# Patient Record
Sex: FEMALE | Race: WHITE | NOT HISPANIC OR LATINO | Employment: FULL TIME | URBAN - METROPOLITAN AREA
[De-identification: names, ages, dates, MRNs, and addresses within clinical notes are randomized per-mention and may not be internally consistent; named-entity substitution may affect disease eponyms.]

---

## 2017-01-09 ENCOUNTER — TELEPHONE (OUTPATIENT)
Dept: MIDWIFE SERVICES | Facility: CLINIC | Age: 32
End: 2017-01-09

## 2017-01-09 NOTE — TELEPHONE ENCOUNTER
PA was denied from insurance company for NuvaRing. It is non-formulary and patient needs to try alternative therapies and fail before this can be approved. Left message to call clinic to inform patient and see what other birth control options she would like to try. Thanks!  Rhina Alexander

## 2017-01-22 ENCOUNTER — TELEPHONE (OUTPATIENT)
Dept: NURSING | Facility: CLINIC | Age: 32
End: 2017-01-22

## 2017-01-22 ENCOUNTER — TELEPHONE (OUTPATIENT)
Dept: MIDWIFE SERVICES | Facility: CLINIC | Age: 32
End: 2017-01-22

## 2017-01-22 DIAGNOSIS — Z30.011 ENCOUNTER FOR INITIAL PRESCRIPTION OF CONTRACEPTIVE PILLS: Primary | ICD-10-CM

## 2017-01-23 NOTE — TELEPHONE ENCOUNTER
Call Type: Triage Call    Presenting Problem: Appeal for prior authorization for patient's  Nuvaring has been denied.  Triage Note:  Guideline Title: No Guideline - Advice Per Reference (Adult)  Recommended Disposition: Provide Home/Self Care  Original Inclination: Wanted to speak with a nurse  Override Disposition:  Intended Action: Follow advice given  Physician Contacted: No  PROVIDE HOME/SELF CARE ?  YES  SEE ED IMMEDIATELY ? NO  CALL POISON CENTER IMMEDIATELY ? NO  CALL LOCAL AGENCY IMMEDIATELY ? NO  SEE DENTIST WITHIN 4 HOURS ? NO  SEE DENTIST WITHIN 24 HOURS ? NO  CALL LOCAL AGENCY WITHIN 24 HOURS ? NO  SEE DENTIST WITHIN 72 HOURS ? NO  ACTIVATE  ? NO  SEE PROVIDER WITHIN 4 HOURS ? NO  SEE PROVIDER WITHIN 24 HOURS ? NO  CALL PROVIDER WITHIN 24 HOURS ? NO  SEE PROVIDER WITHIN 72 HOURS ? NO  SEE PROVIDER WITHIN 2 WEEKS ? NO  CALL PROVIDER WITHIN 72 HOURS ? NO  SEE DENTIST WITHIN 2 WEEKS ? NO  CALL PROVIDER IMMEDIATELY ? NO  Physician Instructions:  Care Advice:

## 2017-01-23 NOTE — TELEPHONE ENCOUNTER
Pt filed an appeal with her insurance company. Pt will wait until Monday of next week to call back and let us know if the appeal has been approved or if she needs to request alternate form of birth control. Maria R Duke RN

## 2017-01-23 NOTE — TELEPHONE ENCOUNTER
Clinic Action Needed:No  Reason for Call: Appeal for Prior Authorization for Nuvaring has been denied.  Routed to: Midwife Providers  Marina Berg RN  [facility]

## 2017-01-23 NOTE — TELEPHONE ENCOUNTER
PA denied. Fax also received. Call to patient to make her aware and see if she wants to try OCP or come back in to discuss other birth control options.  Rhina Alexander

## 2017-01-31 RX ORDER — LEVONORGESTREL/ETHIN.ESTRADIOL 0.1-0.02MG
1 TABLET ORAL DAILY
Qty: 84 TABLET | Refills: 3 | Status: SHIPPED | OUTPATIENT
Start: 2017-01-31 | End: 2019-12-10

## 2017-01-31 NOTE — TELEPHONE ENCOUNTER
Patient stated that appeal is still in process - patient is wondering if another OCP can be sent in the meantime. Patient is hoping for an OCP that is as similar to the Nuvering as possible. Pharmacy teed up.   Rhina Alexander

## 2017-02-01 NOTE — TELEPHONE ENCOUNTER
Ordered an OCP for the patient. Please call her and let her know. She can start this when her Nuva Ring is up. Use back up for 2 weeks until her body adjust to the new birth control. If she misses a pill take two the next day and use back up for 2 weeks. If she misses more than 1 skip those pills and use back up for 2 weeks. Please review ACHES as well. Let me know if she needs anything else. Thank you! Celestina Seay CNM

## 2017-11-11 ENCOUNTER — HEALTH MAINTENANCE LETTER (OUTPATIENT)
Age: 32
End: 2017-11-11

## 2017-12-18 DIAGNOSIS — Z30.44 ENCOUNTER FOR SURVEILLANCE OF VAGINAL RING HORMONAL CONTRACEPTIVE DEVICE: ICD-10-CM

## 2017-12-18 RX ORDER — ETONOGESTREL AND ETHINYL ESTRADIOL VAGINAL RING .015; .12 MG/D; MG/D
RING VAGINAL
Qty: 1 EACH | Refills: 0 | Status: SHIPPED | OUTPATIENT
Start: 2017-12-18 | End: 2017-12-28

## 2017-12-18 NOTE — TELEPHONE ENCOUNTER
Pending Prescriptions:                       Disp   Refills    etonogestrel-ethinyl estradiol (NUVARING)*3 each 0            Sig: Insert 1 ring vaginally every 21 days then remove           for 1 week then repeat with new ring.    Last OV was 11/30/16. Due for AE. TC to patient. Pt aware she needs to schedule AE. Will call back and schedule as she has to request time off from work. One month refill sent to pharmacy to bridge until she schedules AE.   Janee Casillas, RN-BSN

## 2017-12-28 ENCOUNTER — OFFICE VISIT (OUTPATIENT)
Dept: MIDWIFE SERVICES | Facility: CLINIC | Age: 32
End: 2017-12-28
Payer: COMMERCIAL

## 2017-12-28 VITALS
BODY MASS INDEX: 34.49 KG/M2 | DIASTOLIC BLOOD PRESSURE: 89 MMHG | HEART RATE: 118 BPM | OXYGEN SATURATION: 98 % | WEIGHT: 202 LBS | SYSTOLIC BLOOD PRESSURE: 148 MMHG | HEIGHT: 64 IN

## 2017-12-28 DIAGNOSIS — Z30.44 ENCOUNTER FOR SURVEILLANCE OF VAGINAL RING HORMONAL CONTRACEPTIVE DEVICE: ICD-10-CM

## 2017-12-28 DIAGNOSIS — J44.89 CHRONIC OBSTRUCTIVE AIRWAY DISEASE WITH ASTHMA (H): ICD-10-CM

## 2017-12-28 DIAGNOSIS — Z01.419 ENCOUNTER FOR GYNECOLOGICAL EXAMINATION WITHOUT ABNORMAL FINDING: Primary | ICD-10-CM

## 2017-12-28 PROCEDURE — 99395 PREV VISIT EST AGE 18-39: CPT | Performed by: ADVANCED PRACTICE MIDWIFE

## 2017-12-28 PROCEDURE — G0476 HPV COMBO ASSAY CA SCREEN: HCPCS | Performed by: ADVANCED PRACTICE MIDWIFE

## 2017-12-28 PROCEDURE — G0145 SCR C/V CYTO,THINLAYER,RESCR: HCPCS | Performed by: ADVANCED PRACTICE MIDWIFE

## 2017-12-28 RX ORDER — ETONOGESTREL AND ETHINYL ESTRADIOL VAGINAL RING .015; .12 MG/D; MG/D
RING VAGINAL
Qty: 3 EACH | Refills: 3 | Status: SHIPPED | OUTPATIENT
Start: 2017-12-28 | End: 2019-12-10

## 2017-12-28 RX ORDER — ALBUTEROL SULFATE 90 UG/1
AEROSOL, METERED RESPIRATORY (INHALATION)
Qty: 1 INHALER | Refills: 3 | Status: SHIPPED | OUTPATIENT
Start: 2017-12-28 | End: 2020-08-03

## 2017-12-28 NOTE — PROGRESS NOTES
Gardenia  (Dee) is a 32 year old  female who presents for annual exam.     Menses are regular q 28-30 days and variable lasting varies days.  Menses flow: normal and varies.  Patient's last menstrual period was 2017.. Using oral contraceptives for contraception.  She is not currently considering pregnancy.  Besides routine health maintenance, she has no other health concerns today .  GYNECOLOGIC HISTORY:  Menarche:       Gardenia is sexually active with 1male partner(s) and is currently in monogamous relationship with .    History sexually transmitted infections:No STD history  STI testing offered?  Declined  ELLIOTT exposure: No  History of abnormal Pap smear: NO - age 30- 65 PAP every 3 years recommended  Family history of breast CA: No  Family history of uterine/ovarian CA: No    Family history of colon CA: No    HEALTH MAINTENANCE:  Cholesterol: (Cholesterol   Date Value Ref Range Status   10/25/2016 234 (H) <200 mg/dL Final     Comment:     Desirable:       <200 mg/dl    History of abnormal lipids: No  Mammo: 0 . History of abnormal Mammo: Not applicable.  Regular Self Breast Exams: Yes  Calcium/Vitamin D intake: source:  dairy, dietary supplement(s) Adequate? Yes  TSH: (  TSH   Date Value Ref Range Status   10/25/2016 0.84 0.40 - 4.00 mU/L Final    )  Pap; (  Lab Results   Component Value Date    PAP NIL 2014    PAP NIL 2011    PAP NIL 2007    )    HISTORY:  Obstetric History       T0      L0     SAB0   TAB0   Ectopic0   Multiple0   Live Births0         Past Medical History:   Diagnosis Date     ASCUS favor benign 3/2006    neg HPV     Morbid obesity due to excess calories (H) 10/31/2016     Nexplanon insertion 2014    Implanon removed and replaced with Nexplanon nexplanon lot# [id number]  exp:  10/16      Streptococcal sore throat     recurrent pharyngitis     UTI 2003     Past Surgical History:   Procedure Laterality Date     implanon  12      Family History   Problem Relation Age of Onset     Asthma Mother      Heart Failure Mother      Hypertension Mother      Chronic Obstructive Pulmonary Disease Mother      DIABETES Mother      Hypertension Father      Lipids Father      GASTROINTESTINAL DISEASE Father      CANCER Maternal Grandmother      d:  age 53; lung with mets; smoker     Neurologic Disorder Maternal Aunt      b:  1967 ; [place]     Seizure Disorder Brother      Social History     Social History     Marital status: Single     Spouse name: N/A     Number of children: N/A     Years of education: N/A     Social History Main Topics     Smoking status: Never Smoker     Smokeless tobacco: Never Used     Alcohol use Yes      Comment: occ     Drug use: No     Sexual activity: Yes     Partners: Male     Birth control/ protection: Implant      Comment: nuva ring     Other Topics Concern     None     Social History Narrative    Born in [facility], lives with boyfriend of 16 yrs as of 2016, 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, doesn't drive        Balanced Diet - No    Osteoporosis Prevention Measures - lactose intollerant takes pills    Regular Exercise -  Stands all day at work    Dental Exam - Yes, almost a year ago    Seatbelts used - Yes,when remembers    Self Breast Exam - sometimes    Abuse: Current or Past (Physical, Sexual or Emotional)- No    Do you have any concerns about STD's -  No    Do you feel safe in your environment - Yes    Guns stored in the home - No    Sunscreen used - Never out in sun    Lipids -  NO    Glucose -  NO    Eye Exam - When she was 16yrs old    Colon Cancer Screening - No    Hemoccults - NO    Pap Test -  Yers-03/21/06    Mammography - NO    DEXA - NO    Immunizations reviewed and up to date - yes, last td given 03/21/06       Current Outpatient Prescriptions:      etonogestrel-ethinyl estradiol (NUVARING) 0.12-0.015 MG/24HR vaginal ring, Insert 1 ring vaginally every 21 days then remove for 1 week then repeat with new  "ring., Disp: 1 each, Rfl: 0     IBUPROFEN 200 MG PO TABS, 1 TABLET EVERY 4 TO 6 HOURS AS NEEDED, Disp: , Rfl:      levonorgestrel-ethinyl estradiol (AVIANE,ALESSE,LESSINA) 0.1-20 MG-MCG per tablet, Take 1 tablet by mouth daily (Patient not taking: Reported on 12/28/2017), Disp: 84 tablet, Rfl: 3     VENTOLIN  (90 BASE) MCG/ACT Inhaler, INHALE 2 PUFFS PO Q 4 H PRN, Disp: , Rfl: 1     etonogestrel (IMPLANON) 68 MG IMPL, 1 each (68 mg) by Subdermal route once for 1 dose, Disp: 1 each, Rfl: 0     ORDER FOR DME, daily., Disp: 1 Device, Rfl: 0     Allergies   Allergen Reactions     Adhesive Tape      Bandaids irritate skin     Amoxicillin      vaginal irritation after 3-4 days     Azithromycin      zithromax--migraines and stomach cramps     Corn-Related Products      Sulfamethoxazole W/Trimethoprim      GI     Food Nausea     Food intolerance test taken corn ,soy , potato, bananas, carrot, lettuce, bakers yeast, brewers yeast,   Also lactose intolerant, checked on  On 4/16/15  Also eggs dont make her feel well but not officially checked        Past medical, surgical, social and family history were reviewed and updated in EPIC.    ROS:   C:     NEGATIVE for fever, chills, change in weight  I:       NEGATIVE for worrisome rashes, moles or lesions  E:     NEGATIVE for vision changes or irritation  E/M: NEGATIVE for ear, mouth and throat problems  R:     NEGATIVE for significant cough or SOB  CV:   NEGATIVE for chest pain, palpitations or peripheral edema  GI:     NEGATIVE for nausea, abdominal pain, heartburn, or change in bowel habits  :   NEGATIVE for frequency, dysuria, hematuria, vaginal discharge, or irregular bleeding  M:     NEGATIVE for significant arthralgias or myalgia  N:      NEGATIVE for weakness, dizziness or paresthesias  E:      NEGATIVE for temperature intolerance, skin/hair changes  P:      NEGATIVE for changes in mood or affect.    EXAM:  /89  Pulse 118  Ht 5' 3.75\" (1.619 m)  Wt 202 lb " (91.6 kg)  LMP 11/23/2017  SpO2 98%  Breastfeeding? No  BMI 34.95 kg/m2   BMI: Body mass index is 34.95 kg/(m^2).  Constitutional: healthy, alert and no distress  Head: Normocephalic. No masses, lesions, tenderness or abnormalities  Neck: Neck supple. Trachea midline. No adenopathy. Thyroid symmetric, normal size.   Cardiovascular: RRR.   Respiratory: Negative.   Breast: Deferred  Gastrointestinal: Abdomen soft, non-tender, non-distended. No masses, organomegaly.  :  Vulva:  No external lesions, normal female hair distribution, no inguinal adenopathy.    Urethra:  Midline, non-tender, well supported, no discharge  Vagina:  Moist, pink, no abnormal discharge, no lesions  Uterus:  Normal size,  , non-tender, freely mobile  Ovaries:  No masses appreciated, non-tender, mobile  Rectal Exam: deferred  Musculoskeletal: extremities normal  Skin: no suspicious lesions or rashes  Psychiatric: Affect appropriate, cooperative,mentation appears normal.     COUNSELING:   Reviewed preventive health counseling, as reflected in patient instructions   reports that she has never smoked. She has never used smokeless tobacco.    Body mass index is 34.95 kg/(m^2).  Weight management plan: Discussed healthy diet and exercise guidelines and patient will follow up in 12 months in clinic to re-evaluate.  FRAX Risk Assessment    ASSESSMENT:  32 year old female with satisfactory annual exam  (Z01.419) Encounter for gynecological examination without abnormal finding  (primary encounter diagnosis)  Comment:   Plan: Pap imaged thin layer screen with HPV -         recommended age 30 - 65 years (select HPV order        below), HPV High Risk Types DNA Cervical            (Z30.44) Encounter for surveillance of vaginal ring hormonal contraceptive device  Comment:   Plan: etonogestrel-ethinyl estradiol (NUVARING)         0.12-0.015 MG/24HR vaginal ring            (J44.9) Chronic obstructive airway disease with asthma (H)  Comment:   Plan:  VENTOLIN  (90 BASE) MCG/ACT Inhaler            Plan:    Return to the clinic in one year for your next annual appointment or sooner with concerns.    Sarah Redding CNM

## 2017-12-28 NOTE — LETTER
January 9, 2018    Gardenia Blum  [address]  [facility] [place] [facility] [address]    Dear Gardenia,  We are happy to inform you that your PAP smear result from 12/28/17 is normal.  We are now able to do a follow up test on PAP smears. The DNA test is for HPV (Human Papilloma Virus). Cervical cancer is closely linked with certain types of HPV. Your result showed no evidence of high risk HPV.  Therefore we recommend you return in 3 years for your next pap smear.  You will still need to return to the clinic every year for an annual exam and other preventive tests.  Please contact the clinic at [phone] with any questions.  Sincerely,    Sarah Redding CNM, DIANA MOREAU/pedro

## 2017-12-28 NOTE — MR AVS SNAPSHOT
"              After Visit Summary   12/28/2017    Gardenia Blum    MRN: [id number]           Patient Information     Date Of Birth          1985        Visit Information        Provider Department      12/28/2017 2:30 PM Sarah Redidng APRN CNM [facility]        Today's Diagnoses     Encounter for gynecological examination without abnormal finding    -  1    Encounter for surveillance of vaginal ring hormonal contraceptive device        Chronic obstructive airway disease with asthma (H)           Follow-ups after your visit        Who to contact     If you have questions or need follow up information about today's clinic visit or your schedule please contact [facility] directly at [phone].  Normal or non-critical lab and imaging results will be communicated to you by An[employer], letter or phone within 4 business days after the clinic has received the results. If you do not hear from us within 7 days, please contact the clinic through Anvatohart or phone. If you have a critical or abnormal lab result, we will notify you by phone as soon as possible.  Submit refill requests through [employer] or call your pharmacy and they will forward the refill request to us. Please allow 3 business days for your refill to be completed.          Additional Information About Your Visit        MyChart Information     [employer] lets you send messages to your doctor, view your test results, renew your prescriptions, schedule appointments and more. To sign up, go to [email or web address] . Click on \"Log in\" on the left side of the screen, which will take you to the Welcome page. Then click on \"Sign up Now\" on the right side of the page.     You will be asked to enter the access code listed below, as well as some personal information. Please follow the directions to create your username and password.     Your access code is: [email or web address]  Expires: 3/28/2018  4:09 PM     Your access code will " " in 90 days. If you need help or a new code, please call your [place] clinic or [phone].        Care EveryWhere ID     This is your Care EveryWhere ID. This could be used by other organizations to access your [place] medical records  [id number]        Your Vitals Were     Pulse Height Last Period Pulse Oximetry Breastfeeding? BMI (Body Mass Index)    118 5' 3.75\" (1.619 m) 2017 98% No 34.95 kg/m2       Blood Pressure from Last 3 Encounters:   17 148/89   16 127/84   10/25/16 116/78    Weight from Last 3 Encounters:   17 202 lb (91.6 kg)   16 205 lb 8 oz (93.2 kg)   10/25/16 205 lb 8 oz (93.2 kg)              We Performed the Following     HPV High Risk Types DNA Cervical     Pap imaged thin layer screen with HPV - recommended age 30 - 65 years (select HPV order below)          Today's Medication Changes          These changes are accurate as of: 17  4:09 PM.  If you have any questions, ask your nurse or doctor.               These medicines have changed or have updated prescriptions.        Dose/Directions    VENTOLIN  (90 BASE) MCG/ACT Inhaler   This may have changed:  See the new instructions.   Used for:  Chronic obstructive airway disease with asthma (H)   Generic drug:  albuterol   Changed by:  Sarah Redding APRN CNM        INHALE 2 PUFFS PO Q 4 H PRN   Quantity:  1 Inhaler   Refills:  3            Where to get your medicines      These medications were sent to [facility] [address] [place] [address] AT [address] [address]     Phone:  [phone]     etonogestrel-ethinyl estradiol 0.12-0.015 MG/24HR vaginal ring    VENTOLIN  (90 BASE) MCG/ACT Inhaler                Primary Care Provider Office Phone # Fax #    Jacy Cooley [phone] [address] [facility] [address]        Equal Access to Services     ANA MARTINEZ AH: Cheri puente " erasto Hidalgo, warojasda luqadaha, qaybta kaalmada glendy, darlyn nation marieluis monizohaib laemeraldmarie yakov. So [facility] [phone].    ATENCIÓN: Si nazaninla christopher, tiene a ramírez disposición servicios gratuitos de asistencia lingüística. Armand al [phone].    We comply with applicable federal civil rights laws and [place] laws. We do not discriminate on the basis of race, color, national origin, age, disability, sex, sexual orientation, or gender identity.            Thank you!     Thank you for choosing [facility]  for your care. Our goal is always to provide you with excellent care. Hearing back from our patients is one way we can continue to improve our services. Please take a few minutes to complete the written survey that you may receive in the mail after your visit with us. Thank you!             Your Updated Medication List - Protect others around you: Learn how to safely use, store and throw away your medicines at www.disposemymeds.org.          This list is accurate as of: 12/28/17  4:09 PM.  Always use your most recent med list.                   Brand Name Dispense Instructions for use Diagnosis    etonogestrel 68 MG Impl    IMPLANON    1 each    1 each (68 mg) by Subdermal route once for 1 dose    Nexplanon insertion       etonogestrel-ethinyl estradiol 0.12-0.015 MG/24HR vaginal ring    NUVARING    3 each    Insert 1 ring vaginally every 21 days then remove for 1 week then repeat with new ring.    Encounter for surveillance of vaginal ring hormonal contraceptive device       ibuprofen 200 MG tablet    ADVIL/MOTRIN     1 TABLET EVERY 4 TO 6 HOURS AS NEEDED        levonorgestrel-ethinyl estradiol 0.1-20 MG-MCG per tablet    AVIANE,ALESSE,LESSINA    84 tablet    Take 1 tablet by mouth daily    Encounter for initial prescription of contraceptive pills       order for DME     1 Device    daily.    Right wrist pain       VENTOLIN  (90 BASE) MCG/ACT Inhaler   Generic drug:  albuterol     1  Inhaler    INHALE 2 PUFFS PO Q 4 H PRN    Chronic obstructive airway disease with asthma (H)

## 2017-12-28 NOTE — NURSING NOTE
"Chief Complaint   Patient presents with     Physical       Initial There were no vitals taken for this visit. Estimated body mass index is 35.55 kg/(m^2) as calculated from the following:    Height as of 16: 5' 3.75\" (1.619 m).    Weight as of 16: 205 lb 8 oz (93.2 kg).  BP completed using cuff size: regular        The following HM Due: pap smear      The following patient reported/Care Every where data was sent to:  P ABSTRACT QUALITY INITIATIVES [[id number]]  none      patient has appointment for today              "

## 2018-01-03 LAB
COPATH REPORT: NORMAL
PAP: NORMAL

## 2018-01-04 LAB
FINAL DIAGNOSIS: NORMAL
HPV HR 12 DNA CVX QL NAA+PROBE: NEGATIVE
HPV16 DNA SPEC QL NAA+PROBE: NEGATIVE
HPV18 DNA SPEC QL NAA+PROBE: NEGATIVE
SPECIMEN DESCRIPTION: NORMAL

## 2019-05-04 ENCOUNTER — MYC MEDICAL ADVICE (OUTPATIENT)
Dept: FAMILY MEDICINE | Facility: CLINIC | Age: 34
End: 2019-05-04

## 2019-05-04 DIAGNOSIS — Z01.84 IMMUNITY STATUS TESTING: Primary | ICD-10-CM

## 2019-05-06 DIAGNOSIS — Z01.84 IMMUNITY STATUS TESTING: ICD-10-CM

## 2019-05-06 PROCEDURE — 86735 MUMPS ANTIBODY: CPT | Performed by: FAMILY MEDICINE

## 2019-05-06 PROCEDURE — 86762 RUBELLA ANTIBODY: CPT | Performed by: FAMILY MEDICINE

## 2019-05-06 PROCEDURE — 36415 COLL VENOUS BLD VENIPUNCTURE: CPT | Performed by: FAMILY MEDICINE

## 2019-05-06 PROCEDURE — 86765 RUBEOLA ANTIBODY: CPT | Performed by: FAMILY MEDICINE

## 2019-05-06 NOTE — TELEPHONE ENCOUNTER
Dr. Cooley-Please review and advise.  There is one historical MMR immunization under patient's immunization tab and nothing on [facility].    Would recommendation be for patient to complete titers and if not immune to any one of the titers, complete 2-dose MMR series?  Writer would also inform patient if needing MMR vaccination, it is recommended to avoid conception for several months following vaccine administration.    Thank you!  MERVAT Li, CHENGN, RN

## 2019-05-08 LAB
MEV IGG SER QL IA: 7.1 AI (ref 0–0.8)
MUV IGG SER QL IA: 4.5 AI (ref 0–0.8)
RUBV IGG SERPL IA-ACNC: 56 IU/ML

## 2019-05-08 NOTE — RESULT ENCOUNTER NOTE
T[place] Ms. Mendez,  Your results came back showing immunity to measles, mumps & rubella & therefore no repeat vaccine needed.  If you have any further concerns please do not hesitate to contact us by message, phone or making an appointment.  Have a good day   Dr Yasir RICHARDSON

## 2019-08-09 ENCOUNTER — TELEPHONE (OUTPATIENT)
Dept: MIDWIFE SERVICES | Facility: CLINIC | Age: 34
End: 2019-08-09

## 2019-08-09 NOTE — TELEPHONE ENCOUNTER
Reason for call:  Other   Patient called regarding (reason for call): call back  Additional comments:   Dee's ovulation day was 3 days ago. She is inquiring if it is safe to take excedrin for a headache or if she needs to take a different medication.     Phone number to reach patient:  Cell number on file:    Telephone Information:   Mobile [phone]       Best Time:  any    Can we leave a detailed message on this number?  YES

## 2019-08-09 NOTE — TELEPHONE ENCOUNTER
TC to patient. Discussed with patient she could take extra strength Tylenol if she was concerned. Pt states that she has a migraine and usually only Excedrin helps. Since hasn't had a positive pregnancy test yet, could take it. Otherwise take extra strength Tylenol with some caffeine and see if that helps. Pt stated understanding.   Janee Casillas, RN-BSN

## 2019-11-04 ENCOUNTER — HEALTH MAINTENANCE LETTER (OUTPATIENT)
Age: 34
End: 2019-11-04

## 2019-12-10 ENCOUNTER — PRENATAL OFFICE VISIT (OUTPATIENT)
Dept: NURSING | Facility: CLINIC | Age: 34
End: 2019-12-10
Payer: COMMERCIAL

## 2019-12-10 VITALS
TEMPERATURE: 98.1 F | BODY MASS INDEX: 35.68 KG/M2 | SYSTOLIC BLOOD PRESSURE: 128 MMHG | HEART RATE: 102 BPM | HEIGHT: 64 IN | WEIGHT: 209 LBS | DIASTOLIC BLOOD PRESSURE: 71 MMHG

## 2019-12-10 DIAGNOSIS — O09.519 AMA (ADVANCED MATERNAL AGE) PRIMIGRAVIDA 35+: Primary | ICD-10-CM

## 2019-12-10 DIAGNOSIS — Z23 NEED FOR TDAP VACCINATION: ICD-10-CM

## 2019-12-10 DIAGNOSIS — Z23 NEED FOR PROPHYLACTIC VACCINATION AND INOCULATION AGAINST INFLUENZA: ICD-10-CM

## 2019-12-10 LAB
ABO + RH BLD: NORMAL
ABO + RH BLD: NORMAL
ALBUMIN UR-MCNC: NEGATIVE MG/DL
APPEARANCE UR: CLEAR
BILIRUB UR QL STRIP: NEGATIVE
BLD GP AB SCN SERPL QL: NORMAL
BLOOD BANK CMNT PATIENT-IMP: NORMAL
COLOR UR AUTO: YELLOW
ERYTHROCYTE [DISTWIDTH] IN BLOOD BY AUTOMATED COUNT: 13.4 % (ref 10–15)
GLUCOSE UR STRIP-MCNC: NEGATIVE MG/DL
HCT VFR BLD AUTO: 41.6 % (ref 35–47)
HGB BLD-MCNC: 13.8 G/DL (ref 11.7–15.7)
HGB UR QL STRIP: NEGATIVE
KETONES UR STRIP-MCNC: NEGATIVE MG/DL
LEUKOCYTE ESTERASE UR QL STRIP: NEGATIVE
MCH RBC QN AUTO: 28.9 PG (ref 26.5–33)
MCHC RBC AUTO-ENTMCNC: 33.2 G/DL (ref 31.5–36.5)
MCV RBC AUTO: 87 FL (ref 78–100)
NITRATE UR QL: NEGATIVE
PH UR STRIP: 5.5 PH (ref 5–7)
PLATELET # BLD AUTO: 233 10E9/L (ref 150–450)
RBC # BLD AUTO: 4.77 10E12/L (ref 3.8–5.2)
SOURCE: NORMAL
SP GR UR STRIP: <=1.005 (ref 1–1.03)
SPECIMEN EXP DATE BLD: NORMAL
UROBILINOGEN UR STRIP-ACNC: 0.2 EU/DL (ref 0.2–1)
WBC # BLD AUTO: 7.6 10E9/L (ref 4–11)

## 2019-12-10 PROCEDURE — 86780 TREPONEMA PALLIDUM: CPT | Performed by: ADVANCED PRACTICE MIDWIFE

## 2019-12-10 PROCEDURE — 86850 RBC ANTIBODY SCREEN: CPT | Performed by: ADVANCED PRACTICE MIDWIFE

## 2019-12-10 PROCEDURE — 86900 BLOOD TYPING SEROLOGIC ABO: CPT | Performed by: ADVANCED PRACTICE MIDWIFE

## 2019-12-10 PROCEDURE — 81003 URINALYSIS AUTO W/O SCOPE: CPT | Performed by: ADVANCED PRACTICE MIDWIFE

## 2019-12-10 PROCEDURE — 90686 IIV4 VACC NO PRSV 0.5 ML IM: CPT

## 2019-12-10 PROCEDURE — 90471 IMMUNIZATION ADMIN: CPT

## 2019-12-10 PROCEDURE — 85027 COMPLETE CBC AUTOMATED: CPT | Performed by: ADVANCED PRACTICE MIDWIFE

## 2019-12-10 PROCEDURE — 86901 BLOOD TYPING SEROLOGIC RH(D): CPT | Performed by: ADVANCED PRACTICE MIDWIFE

## 2019-12-10 PROCEDURE — 87340 HEPATITIS B SURFACE AG IA: CPT | Performed by: ADVANCED PRACTICE MIDWIFE

## 2019-12-10 PROCEDURE — 86762 RUBELLA ANTIBODY: CPT | Performed by: ADVANCED PRACTICE MIDWIFE

## 2019-12-10 PROCEDURE — 87389 HIV-1 AG W/HIV-1&-2 AB AG IA: CPT | Performed by: ADVANCED PRACTICE MIDWIFE

## 2019-12-10 PROCEDURE — 36415 COLL VENOUS BLD VENIPUNCTURE: CPT | Performed by: ADVANCED PRACTICE MIDWIFE

## 2019-12-10 PROCEDURE — 87086 URINE CULTURE/COLONY COUNT: CPT | Performed by: ADVANCED PRACTICE MIDWIFE

## 2019-12-10 RX ORDER — PRENATAL VIT/IRON FUM/FOLIC AC 27MG-0.8MG
1 TABLET ORAL DAILY
COMMUNITY
End: 2022-10-14

## 2019-12-10 SDOH — HEALTH STABILITY: MENTAL HEALTH
STRESS IS WHEN SOMEONE FEELS TENSE, NERVOUS, ANXIOUS, OR CAN'T SLEEP AT NIGHT BECAUSE THEIR MIND IS TROUBLED. HOW STRESSED ARE YOU?: NOT AT ALL

## 2019-12-10 SDOH — SOCIAL STABILITY: SOCIAL INSECURITY
WITHIN THE LAST YEAR, HAVE YOU BEEN KICKED, HIT, SLAPPED, OR OTHERWISE PHYSICALLY HURT BY YOUR PARTNER OR EX-PARTNER?: NO

## 2019-12-10 SDOH — SOCIAL STABILITY: SOCIAL INSECURITY: WITHIN THE LAST YEAR, HAVE YOU BEEN AFRAID OF YOUR PARTNER OR EX-PARTNER?: NO

## 2019-12-10 SDOH — SOCIAL STABILITY: SOCIAL INSECURITY
WITHIN THE LAST YEAR, HAVE TO BEEN RAPED OR FORCED TO HAVE ANY KIND OF SEXUAL ACTIVITY BY YOUR PARTNER OR EX-PARTNER?: NO

## 2019-12-10 SDOH — SOCIAL STABILITY: SOCIAL INSECURITY: WITHIN THE LAST YEAR, HAVE YOU BEEN HUMILIATED OR EMOTIONALLY ABUSED IN OTHER WAYS BY YOUR PARTNER OR EX-PARTNER?: NO

## 2019-12-10 SDOH — SOCIAL STABILITY: SOCIAL NETWORK: ARE YOU MARRIED, WIDOWED, DIVORCED, SEPARATED, NEVER MARRIED, OR LIVING WITH A PARTNER?: MARRIED

## 2019-12-10 ASSESSMENT — MIFFLIN-ST. JEOR: SCORE: 1624.05

## 2019-12-10 NOTE — PROGRESS NOTES
Important Information for Provider:     Patient presents for new ob teaching and labs, first pregnancy, AMA. Declined first trimester screening. Handouts reviewed and given. Denies any problemas at this time. Has NOB with CNM 12/27/19    Caffeine intake/servings daily - 0  Calcium intake/servings daily - 3  Exercise 5 times weekly - describe ' walks, precautions given  Sunscreen used - Yes  Seatbelts used - Yes  Guns stored in the home - No  Self Breast Exam - Yes  Pap test up to date -  Yes  Eye exam up to date -  Yes  Dental exam up to date -  Yes  Immunizations reviewed and up to date - Yes  Abuse: Current or Past (Physical, Sexual or Emotional) - No  Do you feel safe in your environment - Yes  Do you cope well with stress - Yes  Do you suffer from insomnia - No        Prenatal OB Questionnaire  Patient supplied answers from flow sheet for:  Prenatal OB Questionnaire.  Past Medical History  Diabetes?: No  Hypertension : No  Heart disease, mitral valve prolapse or rheumatic fever?: No  An autoimmune disease such as lupus or rheumatoid arthritis?: Eczema  Kidney disease or urinary tract infection?: No  Epilepsy, seizures or spells?: No  Migraine headaches?:No  A stroke or loss of function or sensation?: No  Any other neurological problems?: No  Have you ever been treated for depression?: No  Are you having problems with crying spells or loss of self-esteem?: No  Have you ever required psychiatric care?: No  Have you ever had hepatitis, liver disease or jaundice?: No  Have you been treated for blood clots in your veins, deep vein thromosis, inflammation in the veins, thrombosis, phlebitis, pulmonary embolism or varicosities?: No  Have you had excessive bleeding after surgery or dental work?: No  Do you bleed more than other women after a cut or scratch?: No  Do you have a history of anemia?: No  Have you ever had thyroid problems or taken thyroid medication?: No   Do you have any endocrine problems?: No  Have you  ever been in a major accident or suffered serious trauma?: No  Within the last year, has anyone hit, slapped, kicked or otherwise hurt you?: No  In the last year, has anyone forced you to have sex when you didn't want to?: No    Past Medical History 2   Have you ever received a blood transfusion?: No  Would you refuse a blood transfusion if a doctor judged it to be medically necessary?: No   If you answered Yes, would you rather die than receive a blood transfusion?: No  If you answered Yes, is this for Pentecostalism reasons?: No  Does anyone in your home smoke?: No  Do you use tobacco products?: No  Do you drink beer, wine or hard liquor?: No  Do you use any of the following: marijuana, speed, cocaine, heroin, hallucinogens or other drugs?: No   Is your blood type Rh negative?: No  Have you ever had abnormal antibodies in your blood?: No  Have you ever had asthma?: No  Have you ever had tuberculosis?: No  Do you have any allergies to drugs or over-the-counter medications?: (!) Yes  Allergies: Dust Mites, Aspartame, Ethanol, Venlafaxine, Hydrochloride, Sertraline: (!) Yes  Have you had any breast problems?: No  Have you ever ?: No  Have you had any gynecological surgical procedures such as cervical conization, a LEEP procedure, laser treatment, cryosurgery of the cervix or a dilation and curettage, etc?: No  Have you ever had any other surgical procedures?: No  Have you been hospitalized for a nonsurgical reason excluding normal delivery?: No  Have you ever had any anesthetic complications?: No  Have you ever had an abnormal pap smear?: No    Past Medical History (Continued)  Do you have a history of abnormalities of the uterus?: No  Did your mother take ELLIOTT or any other hormones when she was pregnant with you?: No  Did it take you more than a year to become pregnant?: No  Have you ever been evaluated or treated for infertility?: No  Is there a history of medical problems in your family, which you feel may be  important to this pregnancy?: No  Do you have any other problems we have not asked about which you feel may be important to this pregnancy?: No    Symptoms since last menstrual period  Do you have any of the following symptoms: abdominal pain, blood in stools or urine, chest pain, shortness of breath, coughing or vomiting up blood, your heart racing or skipping beats, nausea and vomiting, pain on urination or vaginal discharge or bleed: (!) Yes fatigue  Will the patient be 35 years old or older at the time of delivery?: (!) Yes    Has the patient, baby's father or anyone in either family had:  Thalassemia (Italian, Greek, Mediterranean or  background only) and an MCV result less than 80?: No  Neural tube defect such as meningomyelocele, spina bifida or anencephaly?: No  Congenital heart defect?: No  Down's Syndrome?: No  Mauri-Sachs disease (Jainism, Cajun, Hungarian-Mozambican)?: No  Sickle cell disease or trait ()?: No  Hemophilia or other inherited problems of blood?: No  Muscular dystrophy?: No  Cystic fibrosis?: No  Linden's chorea?: No  Mental retardation/autism?: No  If yes, was the person tested for fragile X?: No  Any other inherited genetic or chromosomal disorder?: No  Maternal metabolic disorder (e.g Insulin-dependent diabetes, PKU)?: No  A child with birth defects not listed above?: No  Recurrent pregnancy loss or stillbirth?: No   Has the patient had any medications/street drugs/alcohol since her last menstrual period?: No  Does the patient or baby's father have any other genetic risks?: No    Infection History   Do you object to being tested for Hepatitis B?: No  Do you object to being tested for HIV?: No   Do you feel that you are at high risk for coming in contact with the AIDS virus?: No  Have you ever been treated for tuberculosis?: No  Have you ever had a positive skin test for tuberculosis?: No  Do you live with someone who has tuberculosis?: No  Have you ever been exposed to  tuberculosis?: No  Do you have genital herpes?: No  Does your partner have genital herpes?: No  Have you had a viral illness since your last period?: Yes  Have you ever had gonorrhea, chlamydia, syphilis, venereal warts, trichomoniasis, pelvic inflammatory disease or any other sexually transmitted disease?: No  Do you know if you are a genital group B streptococcus carrier?: No  Have you had chicken pox/varicella?: (!) Yes twice   Have you been vaccinated against chicken Pox?: No  Have you had any other infectious diseases?: No      Allergies as of 12/10/2019:    Allergies as of 12/10/2019 - Reviewed 12/28/2017   Allergen Reaction Noted     Adhesive tape  08/24/2006     Amoxicillin  12/23/2002     Azithromycin  12/23/2002     Corn-related products  10/25/2016     Sulfamethoxazole w/trimethoprim  12/23/2002     Food Nausea 10/25/2016     Liquid adhesive Rash 10/18/2018       Current medications are:  Current Outpatient Medications   Medication Sig Dispense Refill     VENTOLIN  (90 BASE) MCG/ACT Inhaler INHALE 2 PUFFS PO Q 4 H PRN 1 Inhaler 3         Early ultrasound screening tool:    Does patient have irregular periods?  No  Did patient use hormonal birth control in the three months prior to positive urine pregnancy test? No  Is the patient breastfeeding?  No  Is the patient 10 weeks or greater at time of education visit?  No

## 2019-12-11 LAB
BACTERIA SPEC CULT: NORMAL
HBV SURFACE AG SERPL QL IA: NONREACTIVE
HIV 1+2 AB+HIV1 P24 AG SERPL QL IA: NONREACTIVE
RUBV IGG SERPL IA-ACNC: 53 IU/ML
SPECIMEN SOURCE: NORMAL
T PALLIDUM AB SER QL: NONREACTIVE

## 2019-12-27 ENCOUNTER — PRENATAL OFFICE VISIT (OUTPATIENT)
Dept: MIDWIFE SERVICES | Facility: CLINIC | Age: 34
End: 2019-12-27
Payer: COMMERCIAL

## 2019-12-27 VITALS
BODY MASS INDEX: 35.79 KG/M2 | HEART RATE: 109 BPM | WEIGHT: 206.9 LBS | TEMPERATURE: 99 F | DIASTOLIC BLOOD PRESSURE: 82 MMHG | SYSTOLIC BLOOD PRESSURE: 139 MMHG

## 2019-12-27 DIAGNOSIS — O09.519 AMA (ADVANCED MATERNAL AGE) PRIMIGRAVIDA 35+, UNSPECIFIED TRIMESTER: Primary | ICD-10-CM

## 2019-12-27 PROCEDURE — 99214 OFFICE O/P EST MOD 30 MIN: CPT | Performed by: ADVANCED PRACTICE MIDWIFE

## 2019-12-27 NOTE — PROGRESS NOTES
Pt here with  for NOB visit. Feeling well, no concerns. Declines early screening, not due for pap. BP borderline, pt states she gets anxiety when she doesn't know what is going to happen and also she thought she was going to get a speculum exam. She visibly relaxed when she found out that wasn't necessary today. Watch BP closely. Discussed CNM service, schedule of visits, call rotation. RTC in 4 weeks. MML    10w1d   Dee Mendez is a 34 year old who presents to the clinic for an new ob visit. She is a previous CNM patient (for annual exams).  Estimated Date of Delivery: Jul 23, 2020 is calculated from Patient's last menstrual period was 10/17/2019.     She has not had bleeding since her LMP.   She has not had nausea. Weight loss has occurred, for a total of 2 pounds.   This was a planned pregnancy.   BRYSON is involved,  Cristian   OTHER CONCERNS: none    INFECTION HISTORY  HIV: no  Hepatitis B: no  Hepatitis C: no  Syphilis:  no  Tuberculosis: no   PPD- no  Herpes self: no  Herpes partner:  no  Chlamydia:  no  Gonorrhea:  no  HPV: no  BV:  no  Trichomonis:  no  Chicken Pox:  YES  ====================================================  GENETIC SCREENING  Genetic screening reviewed. High Risk? AMA  ====================================================  PERSONAL/SOCIAL HISTORY  Lives lives with their spouse.  Employment: Full time.  Her job involves moderate activity .  =====================================================   REVIEW OF SYSTEMS  CONSTITUTIONAL: NEGATIVE for fever, chills  EYES: NEGATIVE for vision changes   RESP: NEGATIVE for significant cough or SOB  CV: NEGATIVE for chest pain, palpitations   GI: NEGATIVE for nausea, abdominal pain, heartburn, or change in bowel habits  : NEGATIVE for frequency, dysuria, or hematuria  MUSCULOSKELETAL: NEGATIVE for significant arthralgias or myalgia  NEURO: NEGATIVE for weakness, dizziness or paresthesias or  headache  ====================================================    PHYSICAL EXAM:  /82   Pulse 109   Temp 99  F (37.2  C) (Oral)   Wt 93.8 kg (206 lb 14.4 oz)   LMP 10/17/2019   BMI 35.79 kg/m    BMI- Body mass index is 35.79 kg/m .,     RECOMMENDED WEIGHT GAIN: 10-15 lbs.  GENERAL:  Pleasant pregnant female, alert, well groomed.   SKIN:  Warm and dry, without lesions or rashes  HEAD: Symmetrical features.  NECK:  Thyroid without enlargement and nodules.  Lymph nodes not palpable.  LUNGS:  Clear to auscultation.      HEART:  RRR without murmur.  ABDOMEN: Soft without masses , tenderness or organomegaly.  No CVA tenderness. No scars noted.     MUSCULOSKELETAL:  Full range of motion  EXTREMITIES:  No edema. No significant varicosities.     =========================================  ASSESSMENT:  10w1d  AMA, primip, unspecified trimester  ==========================================  PLAN:  Instructed on use of triage nurse line and contacting the on call CNM after hours for an urgent need such as fever, vagina bleeding, bladder or vaginal infection, rupture of membranes,  or term labor.    Discussed the indications, uses for and false positives for quad screen, nuchal translucency and fetal survey ultrasound at 18-20 weeks gestation. Will inform us at the next visit if she wished to avail herself of these screens.  Instructed on best evidence for: weight gain for her BMI for pregnancy; healthy diet and foods to avoid; exercise and activity during pregnancy;avoiding exposure to toxoplasmosis; and maintenance of a generally healthy lifestyle.   Discussed the harms, benefits, side effects and alternative therapies for current prescribed and OTC medications.  DIANA Jim CNM

## 2019-12-27 NOTE — NURSING NOTE
"Chief Complaint   Patient presents with     Prenatal Care     10+1       Initial /82   Pulse 109   Temp 99  F (37.2  C) (Oral)   Wt 93.8 kg (206 lb 14.4 oz)   LMP 10/17/2019   BMI 35.79 kg/m   Estimated body mass index is 35.79 kg/m  as calculated from the following:    Height as of 12/10/19: 1.619 m (5' 3.75\").    Weight as of this encounter: 93.8 kg (206 lb 14.4 oz).  BP completed using cuff size: regular    Questioned patient about current smoking habits.           The following HM Due: NONE      The following patient reported/Care Every where data was sent to:  P ABSTRACT QUALITY INITIATIVES [36465]        patient has appointment for today  Martine Gibson                "

## 2020-01-24 ENCOUNTER — PRENATAL OFFICE VISIT (OUTPATIENT)
Dept: MIDWIFE SERVICES | Facility: CLINIC | Age: 35
End: 2020-01-24
Payer: COMMERCIAL

## 2020-01-24 VITALS
WEIGHT: 209 LBS | HEART RATE: 105 BPM | SYSTOLIC BLOOD PRESSURE: 121 MMHG | DIASTOLIC BLOOD PRESSURE: 77 MMHG | BODY MASS INDEX: 36.16 KG/M2 | OXYGEN SATURATION: 99 %

## 2020-01-24 DIAGNOSIS — O09.519 AMA (ADVANCED MATERNAL AGE) PRIMIGRAVIDA 35+, UNSPECIFIED TRIMESTER: Primary | ICD-10-CM

## 2020-01-24 PROCEDURE — 99212 OFFICE O/P EST SF 10 MIN: CPT | Performed by: ADVANCED PRACTICE MIDWIFE

## 2020-01-24 NOTE — PROGRESS NOTES
14w1d  Here with Cristian, in [facility] [place] August.. MFM U/S ordered. Reports back pain,will return to chiropractor, enc. Cat/cows /pelvic tilts.  Warning signs reviewed. rtc for [facility] [place] in 5 weeks hopes to get U/S around that day.  mallorie

## 2020-01-27 ENCOUNTER — TRANSCRIBE ORDERS (OUTPATIENT)
Dept: MATERNAL FETAL MEDICINE | Facility: CLINIC | Age: 35
End: 2020-01-27

## 2020-01-27 DIAGNOSIS — O26.90 PREGNANCY RELATED CONDITION, ANTEPARTUM: Primary | ICD-10-CM

## 2020-02-25 ENCOUNTER — PRE VISIT (OUTPATIENT)
Dept: MATERNAL FETAL MEDICINE | Facility: CLINIC | Age: 35
End: 2020-02-25

## 2020-02-27 ENCOUNTER — HOSPITAL ENCOUNTER (OUTPATIENT)
Dept: ULTRASOUND IMAGING | Facility: CLINIC | Age: 35
Discharge: HOME | End: 2020-02-27
Attending: ADVANCED PRACTICE MIDWIFE | Admitting: OBSTETRICS & GYNECOLOGY
Payer: COMMERCIAL

## 2020-02-27 ENCOUNTER — OFFICE VISIT (OUTPATIENT)
Dept: MATERNAL FETAL MEDICINE | Facility: CLINIC | Age: 35
End: 2020-02-27
Attending: ADVANCED PRACTICE MIDWIFE
Payer: COMMERCIAL

## 2020-02-27 ENCOUNTER — PRENATAL OFFICE VISIT (OUTPATIENT)
Dept: MIDWIFE SERVICES | Facility: CLINIC | Age: 35
End: 2020-02-27
Payer: COMMERCIAL

## 2020-02-27 VITALS
DIASTOLIC BLOOD PRESSURE: 84 MMHG | BODY MASS INDEX: 37.02 KG/M2 | HEART RATE: 100 BPM | SYSTOLIC BLOOD PRESSURE: 124 MMHG | WEIGHT: 214 LBS

## 2020-02-27 DIAGNOSIS — O09.522 MULTIGRAVIDA OF ADVANCED MATERNAL AGE IN SECOND TRIMESTER: Primary | ICD-10-CM

## 2020-02-27 DIAGNOSIS — O09.512 SUPERVISION OF ELDERLY PRIMIGRAVIDA IN SECOND TRIMESTER: Primary | ICD-10-CM

## 2020-02-27 DIAGNOSIS — O26.90 PREGNANCY RELATED CONDITION, ANTEPARTUM: ICD-10-CM

## 2020-02-27 DIAGNOSIS — Z34.02 ENCOUNTER FOR SUPERVISION OF NORMAL FIRST PREGNANCY IN SECOND TRIMESTER: Primary | ICD-10-CM

## 2020-02-27 PROBLEM — O09.529 AMA (ADVANCED MATERNAL AGE) MULTIGRAVIDA 35+: Status: ACTIVE | Noted: 2020-02-27

## 2020-02-27 PROCEDURE — 76811 OB US DETAILED SNGL FETUS: CPT

## 2020-02-27 PROCEDURE — 99212 OFFICE O/P EST SF 10 MIN: CPT | Performed by: ADVANCED PRACTICE MIDWIFE

## 2020-02-27 PROCEDURE — 96040 ZZH GENETIC COUNSELING, EACH 30 MINUTES: CPT | Mod: ZF | Performed by: GENETIC COUNSELOR, MS

## 2020-02-27 NOTE — PROGRESS NOTES
"Please see \"Imaging\" tab under \"Chart Review\" for details of today's visit.    Coy Solorio    "

## 2020-02-27 NOTE — PROGRESS NOTES
19w0d  Patient feeling well. Denies any vaginal bleeding, water leaking, abdominal pain, or other concerns. Starting to feel baby moving.  Discussed genetic testing, declined. M fetal survey today, normal ultrasound, boy.    Participated in shared wisdom session 1: introduction and nutrition.   Danger signs discussed. Patient knows when to call triage and has numbers to call.   Follow up in 4 weeks.   Celestina Seay CNM

## 2020-02-27 NOTE — PROGRESS NOTES
[facility]  Genetic Counseling Consult    Patient: Dee Mendez YOB: 1985   Date of Service: 20      Dee Mendez was seen at [facility] for genetic consultation to discuss the options for screening and testing for fetal chromosome abnormalities.  The indication for genetic counseling is Level 2 Ultrasound for advanced maternal age. Her  Cristian was present with her at the appointment.       Impression/Plan:   1.  Dee had a level II ultrasound.  Results will be available in EPIC.      2.  Maternal serum AFP (single marker screen) is available after 15 weeks to screen for open neural tube defects. A quad screen should not be performed.    3.  Dee declined any genetic screening.     Pregnancy History:   /Parity:    Age at Delivery: 35 year old  ENRIKE: 2020, by Last Menstrual Period  Gestational Age: 19w0d    No significant complications or exposures were reported in the current pregnancy.    Dee states that she is getting heart palpitations during pregnancy.    Medical History:     Dee s reported medical history is not expected to impact pregnancy management or risks to fetal development. She states that she frequently gets bronchitis.     Dee works with individuals with disability.       Family History:   A three-generation pedigree was obtained, and is scanned under the  Media  tab.   The following significant findings were reported by Dee:    Dee's brother was diagnosed with epilepsy at age 13. He was treated with medication for a short time period and no longer takes medication currently. The majority of the time we are not able to determine the cause. If there are more family members, or more symptoms in this individual, this might better inform what the cause of the epilepsy is. Dee brought up that she thought it was likely hormonal or environmental. This could be a possibility as  well. There are currently no changes to pregnancy care based on this information.    Dee's mother had a stillbirth due to nuchal cord.    Dee's father was diagnosed with lung cancer. He smokes. He has had seizures. Dee thinks his health problems are environmental.    Cristian is 38 years old. He has a paternal aunt who has a cleft lip. There is a slightly higher chance of close relatives having a cleft lip, but the majority happen by chance. Because of how distantly related this aunt is, we are not concerned there is a higher risk for pregnancy. The comprehensive ultrasound is able to look at this region.      Otherwise, the reported family history is negative for multiple miscarriages, stillbirths, birth defects, cognitive impairment, known genetic conditions, and consanguinity.       Carrier Screening:   The patient reports that she and the father of the pregnancy have  ancestry. Cristian has Nigerian and Honduran ancestry:      Cystic fibrosis is an autosomal recessive genetic condition that occurs with increased frequency in individuals of  ancestry and carrier screening for this condition is available.  In addition,  screening in the [facility] includes cystic fibrosis.      Expanded carrier screening for mutations in a large panel of genes associated with autosomal recessive conditions including cystic fibrosis, spinal muscular atrophy, and others, is now available.      Carrier screening was beyond the scope of our discussion today.           Testing Options:   We discussed the following options:     Comprehensive (Level II) ultrasound: Detailed ultrasound performed between 18-22 weeks gestation to  screen for major birth defects and markers for aneuploidy.     We discussed that there are options available if there are any differences that the doctor is concerned  about.      Non-invasive Prenatal Testing (NIPT)    Maternal plasma cell-free DNA testing; first trimester ultrasound with  nuchal translucency and nasal bone assessment is recommended, when appropriate    Screens for fetal trisomy 21, trisomy 13, trisomy 18, and sex chromosome aneuploidy    Cannot screen for open neural tube defects; maternal serum AFP after 15 weeks is recommended       Genetic Amniocentesis    Invasive procedure typically performed in the second trimester by which amniotic fluid is obtained for the purpose of chromosome analysis and/or other prenatal genetic analysis    Diagnostic results; >99% sensitivity for fetal chromosome abnormalities    AFAFP measurement tests for open neural tube defects      Dee stated she is not interested in genetic screening. She might consider genetic screening if there is anything to be concerned about during pregnancy, but she would not consider invasive testing. We reviewed the benefits and limitations of this testing.  Screening tests provide a risk assessment specific to the pregnancy for certain fetal chromosome abnormalities, but cannot definitively diagnose or exclude a fetal chromosome abnormality.  Follow-up genetic counseling and consideration of diagnostic testing is recommended with any abnormal screening result.     Diagnostic tests carry inherent risks- including risk of miscarriage- that require careful consideration.  These tests can detect fetal chromosome abnormalities with greater than 99% certainty.  Results can be compromised by maternal cell contamination or mosaicism, and are limited by the resolution of cytogenetic G-banding technology.  There is no screening nor diagnostic test that can detect all forms of birth defects or mental disability.     It was a pleasure to be involved with Dee s care. Face-to-face time of the meeting was 25 minutes.    Marina Noriega,  student    Patient seen, evaluated and discussed with the Genetic Counseling Intern. I have verified the content of the note, which accurately reflects my assessment of the patient and the plan of  care.   Supervising Genetic Counselor   Leonidas Wynn MS, [facility]   Maternal Fetal Medicine  [facility]   Phone: [phone]   Email: [email or web address]

## 2020-04-08 ENCOUNTER — PRENATAL OFFICE VISIT (OUTPATIENT)
Dept: MIDWIFE SERVICES | Facility: CLINIC | Age: 35
End: 2020-04-08
Payer: MEDICAID

## 2020-04-08 DIAGNOSIS — O09.512 AMA (ADVANCED MATERNAL AGE) PRIMIGRAVIDA 35+, SECOND TRIMESTER: Primary | ICD-10-CM

## 2020-04-08 PROCEDURE — 99212 OFFICE O/P EST SF 10 MIN: CPT | Mod: 95 | Performed by: ADVANCED PRACTICE MIDWIFE

## 2020-04-08 NOTE — PROGRESS NOTES
"Dee Mendez is a 34 year old female who is being evaluated via a billable telephone visit.      The patient has been notified of following:     \"This telephone visit will be conducted via a call between you and your physician/provider. We have found that certain health care needs can be provided without the need for a physical exam.  This service lets us provide the care you need with a short phone conversation.  If a prescription is necessary we can send it directly to your pharmacy.  If lab work is needed we can place an order for that and you can then stop by our lab to have the test done at a later time.    Telephone visits are billed at different rates depending on your insurance coverage. During this emergency period, for some insurers they may be billed the same as an in-person visit.  Please reach out to your insurance provider with any questions.    If during the course of the call the physician/provider feels a telephone visit is not appropriate, you will not be charged for this service.\"    Patient has given verbal consent for Telephone visit?  Yes    Dee Mendez complains of    Chief Complaint   Patient presents with     Prenatal Care       I have reviewed and updated the patient's Past Medical History, Social History, Family History and Medication List.    ALLERGIES  Adhesive tape; Amoxicillin; Azithromycin; Corn-related products; Sulfamethoxazole w/trimethoprim; Food; and Liquid adhesive    Additional provider notes:  24w6d   Patient feeling well. Positive fetal movement. Denies water leaking, vaginal bleeding, decreased fetal movement, contraction pain, or headaches.   Doing well. No questions or concerns today. Discussed cancelled shared wisdom for the time being. Discussed prenatal visits being every other in person and phone.    is working, she is home, laid off at work and hoping to get back to work.   Danger signs reviewed, pre-eclampsia signs and symptoms discussed.   Knows when to " call triage and has phone numbers.   Follow up at 28 weeks for GCT and tdap vaccination.     Phone call duration: 10 minutes    DIANA Lopez CNM

## 2020-04-26 ENCOUNTER — MYC MEDICAL ADVICE (OUTPATIENT)
Dept: MIDWIFE SERVICES | Facility: CLINIC | Age: 35
End: 2020-04-26

## 2020-04-26 DIAGNOSIS — O09.512 AMA (ADVANCED MATERNAL AGE) PRIMIGRAVIDA 35+, SECOND TRIMESTER: Primary | ICD-10-CM

## 2020-04-29 NOTE — TELEPHONE ENCOUNTER
Routing to O. I spoke with the lab and they are trying to special order a glucose drink with out corn. It's taking a while to get an answer from the lab supervisor. Could the patient drink 3 of the apple juices? The glucose drink has 50g sugar. Each apple juice has 17g sugar. Or do you have a different idea? I want to be able to get back to the patient. Thanks.   Janee Casillas, RN-BSN

## 2020-04-30 NOTE — TELEPHONE ENCOUNTER
Lab order placed for blood sugar test (GLUWH). Patient sent my chart message back with plan regarding glucose test using apple juice. Encouraged patient to call to schedule 28 week midwife appointment and doing glucose test at that time. Janee Roblero RN

## 2020-04-30 NOTE — TELEPHONE ENCOUNTER
Lab still waiting to hear back from their supervisor. Will wait to for an update prior to having patient start checking her own blood sugars. Janee Roblero RN

## 2020-04-30 NOTE — TELEPHONE ENCOUNTER
She could check her blood sugars for 2 weeks. 1 fasting and 3 PP per day. Otherwise I would wait for the alternative drink. They are very strict on that. We have tried to use Jelly Beans in the past but they said no.   Thanks, Pina

## 2020-04-30 NOTE — TELEPHONE ENCOUNTER
Hello, I received and email from lab, they said the apple juice would work so that is fine with me. Here is their email.     Ok, we can match the sugar content by giving the patient 3 cans of apples juice (the cans are very small, each only 5.5 floz), which equals 51 grams of sugar. This will be comparable to the regular glucose drink (10 floz), which contains 50 grams of sugar. The blood sugar can be tested an hour after consumption.   Since the patient is not consuming the designated glucose drink for 1 hour glucose testing, this must be ordered as a regular blood sugar test (GLUWH). Comments will be included with the results indicating that the test was drawn 1 hour after 50 grams of sugar was consumed.   Let me know how that sounds. Thanks.    Thanks for your help with this! Pina

## 2020-05-06 ENCOUNTER — PRENATAL OFFICE VISIT (OUTPATIENT)
Dept: MIDWIFE SERVICES | Facility: CLINIC | Age: 35
End: 2020-05-06
Payer: COMMERCIAL

## 2020-05-06 VITALS
OXYGEN SATURATION: 98 % | DIASTOLIC BLOOD PRESSURE: 78 MMHG | HEART RATE: 110 BPM | BODY MASS INDEX: 37.19 KG/M2 | WEIGHT: 215 LBS | SYSTOLIC BLOOD PRESSURE: 117 MMHG

## 2020-05-06 DIAGNOSIS — R12 HEARTBURN IN PREGNANCY IN THIRD TRIMESTER: ICD-10-CM

## 2020-05-06 DIAGNOSIS — O26.893 HEARTBURN IN PREGNANCY IN THIRD TRIMESTER: ICD-10-CM

## 2020-05-06 DIAGNOSIS — O09.513 PRIMIGRAVIDA OF ADVANCED MATERNAL AGE IN THIRD TRIMESTER: Primary | ICD-10-CM

## 2020-05-06 DIAGNOSIS — Z23 NEED FOR TDAP VACCINATION: ICD-10-CM

## 2020-05-06 LAB — HGB BLD-MCNC: 13.4 G/DL (ref 11.7–15.7)

## 2020-05-06 PROCEDURE — 90471 IMMUNIZATION ADMIN: CPT | Performed by: ADVANCED PRACTICE MIDWIFE

## 2020-05-06 PROCEDURE — 00000218 ZZHCL STATISTIC OBHBG - HEMOGLOBIN: Performed by: ADVANCED PRACTICE MIDWIFE

## 2020-05-06 PROCEDURE — 99207 ZZC PRENATAL VISIT: CPT | Performed by: ADVANCED PRACTICE MIDWIFE

## 2020-05-06 PROCEDURE — 90715 TDAP VACCINE 7 YRS/> IM: CPT | Performed by: ADVANCED PRACTICE MIDWIFE

## 2020-05-06 NOTE — PROGRESS NOTES
28w6d  Dee is feeling well today. Reports good fetal movement. Denies leaking of fluid, vaginal bleeding, regular uterine contractions, headache, visual changes, or other concerns. She has an an on her phone that she is using to track her pregnancy and has dating recorded as 29w today, a one day discrepancy. Discussed that dating in our system is the date that we will use to guide recommendations for the rest of her pregnancy. Dee has been experiencing heartburn this pregnancy and taking some ranitidine that she had left over from different Rx. TUMS are unhelpful. Sent new Rx for ranitidine 150mg, to be taken up to twice daily. Dee has had some sx of yeast infection this pregnancy, including itching and change in discharge. She self treated about one month ago and symptoms resolved. Taking a probiotic now which she believes is helping to prevent further infection. Discussed risk of vaginal infections in pregnancy and encouraged to discuss with CNM team if symptoms return. No need for wet prep today. GCT today with apple juice, approved by lab. TDAP today. Hgb today. Virtual visit in 2 weeks.    DIANA Benoit CNM

## 2020-05-08 ENCOUNTER — MYC MEDICAL ADVICE (OUTPATIENT)
Dept: MIDWIFE SERVICES | Facility: CLINIC | Age: 35
End: 2020-05-08

## 2020-05-08 DIAGNOSIS — R12 HEARTBURN IN PREGNANCY IN THIRD TRIMESTER: Primary | ICD-10-CM

## 2020-05-08 DIAGNOSIS — O26.893 HEARTBURN IN PREGNANCY IN THIRD TRIMESTER: Primary | ICD-10-CM

## 2020-05-11 RX ORDER — OMEPRAZOLE 20 MG/1
20 TABLET, DELAYED RELEASE ORAL DAILY
Qty: 90 TABLET | Refills: 0 | Status: ON HOLD | OUTPATIENT
Start: 2020-05-11 | End: 2020-07-31

## 2020-05-11 NOTE — TELEPHONE ENCOUNTER
See mychart.  Pt was prescribed zantac.  Please order differnet medication for heartburn.  Jeanette Huffman RN

## 2020-05-20 ENCOUNTER — PRENATAL OFFICE VISIT (OUTPATIENT)
Dept: MIDWIFE SERVICES | Facility: CLINIC | Age: 35
End: 2020-05-20
Payer: COMMERCIAL

## 2020-05-20 VITALS — WEIGHT: 215 LBS | BODY MASS INDEX: 37.19 KG/M2

## 2020-05-20 DIAGNOSIS — O09.513 AMA (ADVANCED MATERNAL AGE) PRIMIGRAVIDA 35+, THIRD TRIMESTER: Primary | ICD-10-CM

## 2020-05-20 PROCEDURE — 99207 ZZC PRENATAL VISIT: CPT | Performed by: ADVANCED PRACTICE MIDWIFE

## 2020-05-20 RX ORDER — LACTOBACILLUS RHAMNOSUS GG 10B CELL
1 CAPSULE ORAL 2 TIMES DAILY
COMMUNITY
End: 2020-05-20

## 2020-05-20 NOTE — PROGRESS NOTES
"Dee Mendez is a 34 year old female who is being evaluated via a billable telephone visit.      The patient has been notified of following:     \"This telephone visit will be conducted via a call between you and your physician/provider. We have found that certain health care needs can be provided without the need for a physical exam.  This service lets us provide the care you need with a short phone conversation.  If a prescription is necessary we can send it directly to your pharmacy.  If lab work is needed we can place an order for that and you can then stop by our lab to have the test done at a later time.    Telephone visits are billed at different rates depending on your insurance coverage. During this emergency period, for some insurers they may be billed the same as an in-person visit.  Please reach out to your insurance provider with any questions.    If during the course of the call the physician/provider feels a telephone visit is not appropriate, you will not be charged for this service.\"    Patient has given verbal consent for Telephone visit?  Yes    Additional Provider Notes:  30w6d   Patient feeling well. Positive fetal movement. Denies water leaking, vaginal bleeding, decreased fetal movement, contraction pain, or headaches.   Doing well. No questions or concerns. Trying to do some birthing classes but having a hard time with online stuff. Only desire is to be there as short as possible. She has never been away from her dog so worried about being away from the dog for too long. Interested in water birth, not set on any ideas. Plans on making decision as she goes and see how things work out. Open to epidural.   Omeprazole is working well for heartburn. Discussed future visits.   Danger signs reviewed, pre-eclampsia signs and symptoms discussed.   Knows when to call triage and has phone numbers.  Follow up in 2 weeks for in person visit.     Phone call duration: 15 minutes    DIANA Lopez " CNM

## 2020-06-04 ENCOUNTER — PRENATAL OFFICE VISIT (OUTPATIENT)
Dept: MIDWIFE SERVICES | Facility: CLINIC | Age: 35
End: 2020-06-04
Payer: COMMERCIAL

## 2020-06-04 VITALS
TEMPERATURE: 98.8 F | HEART RATE: 107 BPM | DIASTOLIC BLOOD PRESSURE: 80 MMHG | BODY MASS INDEX: 37.52 KG/M2 | WEIGHT: 216.9 LBS | SYSTOLIC BLOOD PRESSURE: 120 MMHG

## 2020-06-04 DIAGNOSIS — O09.513 AMA (ADVANCED MATERNAL AGE) PRIMIGRAVIDA 35+, THIRD TRIMESTER: Primary | ICD-10-CM

## 2020-06-04 PROCEDURE — 99207 ZZC PRENATAL VISIT: CPT | Performed by: ADVANCED PRACTICE MIDWIFE

## 2020-06-04 NOTE — PROGRESS NOTES
Here in clinic, feeling well without concerns. She is teary when talking about current protests/riots. She lives 6 blocks from [address] [place], [place] [address]. Overall, feels she is coping well and doing ok. Encouraged her to reach out if she needs help with anything. Baby is active. No regular contractions, LOF or bleeding. Anxious about leaving dog as she has never been away from her. Has an adhesive allergy so wants to avoid IV, epidural, etc. Her skin reacts to adhesive and gets pulled off with the removal of adhesive. Gave her a tegaderm to see if she reacts to that. Interested in water birth, will draw Hep C at 36 weeks. RTC in 3 weeks for GBS, hgb, Hep C and BSUS. MML

## 2020-06-25 ENCOUNTER — MYC MEDICAL ADVICE (OUTPATIENT)
Dept: MIDWIFE SERVICES | Facility: CLINIC | Age: 35
End: 2020-06-25

## 2020-06-25 ENCOUNTER — PRENATAL OFFICE VISIT (OUTPATIENT)
Dept: MIDWIFE SERVICES | Facility: CLINIC | Age: 35
End: 2020-06-25
Payer: COMMERCIAL

## 2020-06-25 VITALS
WEIGHT: 220.9 LBS | HEART RATE: 126 BPM | DIASTOLIC BLOOD PRESSURE: 83 MMHG | SYSTOLIC BLOOD PRESSURE: 132 MMHG | BODY MASS INDEX: 38.22 KG/M2

## 2020-06-25 DIAGNOSIS — O09.513 AMA (ADVANCED MATERNAL AGE) PRIMIGRAVIDA 35+, THIRD TRIMESTER: Primary | ICD-10-CM

## 2020-06-25 LAB — HGB BLD-MCNC: 13.6 G/DL (ref 11.7–15.7)

## 2020-06-25 PROCEDURE — 00000218 ZZHCL STATISTIC OBHBG - HEMOGLOBIN: Performed by: ADVANCED PRACTICE MIDWIFE

## 2020-06-25 PROCEDURE — 36415 COLL VENOUS BLD VENIPUNCTURE: CPT | Performed by: ADVANCED PRACTICE MIDWIFE

## 2020-06-25 PROCEDURE — 86803 HEPATITIS C AB TEST: CPT | Performed by: ADVANCED PRACTICE MIDWIFE

## 2020-06-25 PROCEDURE — 99207 ZZC PRENATAL VISIT: CPT | Performed by: ADVANCED PRACTICE MIDWIFE

## 2020-06-25 PROCEDURE — 87653 STREP B DNA AMP PROBE: CPT | Performed by: ADVANCED PRACTICE MIDWIFE

## 2020-06-25 ASSESSMENT — PATIENT HEALTH QUESTIONNAIRE - PHQ9: SUM OF ALL RESPONSES TO PHQ QUESTIONS 1-9: 0

## 2020-06-25 NOTE — PROGRESS NOTES
Here in clinic, feeling well. Concerned that baby is getting too big. Fundal height today is 4cm s>d, growth US ordered. Pt reports her  was 10-11lbs at birth. Baby is active. No regular contractions, LOF or bleeding. Reviewed some standard procedures at birth place, patient putting together a birth plan. Interested in water birth, signed agreement today. GBS, hgb and Hep C today. RTC weekly. MML

## 2020-06-26 LAB
GP B STREP DNA SPEC QL NAA+PROBE: NEGATIVE
HCV AB SERPL QL IA: NONREACTIVE
SPECIMEN SOURCE: NORMAL

## 2020-06-26 NOTE — TELEPHONE ENCOUNTER
Please see mychart message. Patient has several questions regarding labor and delivery (cord clamping, 's, etc.). Thanks, Janee Roblero RN

## 2020-07-02 ENCOUNTER — PRENATAL OFFICE VISIT (OUTPATIENT)
Dept: MIDWIFE SERVICES | Facility: CLINIC | Age: 35
End: 2020-07-02
Attending: ADVANCED PRACTICE MIDWIFE
Payer: COMMERCIAL

## 2020-07-02 ENCOUNTER — ANCILLARY PROCEDURE (OUTPATIENT)
Dept: ULTRASOUND IMAGING | Facility: CLINIC | Age: 35
End: 2020-07-02
Attending: ADVANCED PRACTICE MIDWIFE
Payer: COMMERCIAL

## 2020-07-02 VITALS
HEART RATE: 99 BPM | BODY MASS INDEX: 38.82 KG/M2 | SYSTOLIC BLOOD PRESSURE: 133 MMHG | DIASTOLIC BLOOD PRESSURE: 88 MMHG | WEIGHT: 224.4 LBS

## 2020-07-02 DIAGNOSIS — Z34.03 ENCOUNTER FOR SUPERVISION OF NORMAL FIRST PREGNANCY IN THIRD TRIMESTER: Primary | ICD-10-CM

## 2020-07-02 DIAGNOSIS — O09.513 AMA (ADVANCED MATERNAL AGE) PRIMIGRAVIDA 35+, THIRD TRIMESTER: ICD-10-CM

## 2020-07-02 PROCEDURE — 76816 OB US FOLLOW-UP PER FETUS: CPT | Performed by: OBSTETRICS & GYNECOLOGY

## 2020-07-02 PROCEDURE — 99207 ZZC PRENATAL VISIT: CPT | Performed by: ADVANCED PRACTICE MIDWIFE

## 2020-07-02 NOTE — PROGRESS NOTES
37w0d  Dee is feeling well today and coming to appointment after US. US shows EFW of 8lb 3oz, or 96%ile. Discussed that this measurement does not warrant recommendation of  birth. Dee is hoping to go into labor spontaneously and is hoping to have a waterbirth. Reports good fetal movement. Denies leaking of fluid, vaginal bleeding, regular uterine contractions, headache, visual changes, or other concerns. Return to clinic in 1 week.    DIANA Benoit CNM

## 2020-07-10 ENCOUNTER — PRENATAL OFFICE VISIT (OUTPATIENT)
Dept: MIDWIFE SERVICES | Facility: CLINIC | Age: 35
End: 2020-07-10
Payer: COMMERCIAL

## 2020-07-10 VITALS
WEIGHT: 221.9 LBS | SYSTOLIC BLOOD PRESSURE: 130 MMHG | BODY MASS INDEX: 38.39 KG/M2 | HEART RATE: 98 BPM | DIASTOLIC BLOOD PRESSURE: 87 MMHG

## 2020-07-10 DIAGNOSIS — Z34.03 ENCOUNTER FOR SUPERVISION OF NORMAL FIRST PREGNANCY IN THIRD TRIMESTER: Primary | ICD-10-CM

## 2020-07-10 PROCEDURE — 99207 ZZC PRENATAL VISIT: CPT | Performed by: ADVANCED PRACTICE MIDWIFE

## 2020-07-10 NOTE — PROGRESS NOTES
38w1d  Dee is here today with her , Ruben. She has birth plan with her today. Discussed that baby's size is not a contraindication to waterbirth, but depending on labor course and CNM insight, could recommend birth out of tub. She is okay with this, and trusts judgement and management of team. Discuss that sometimes patients develop gestational diabetes even after they pass a GCT. She declines further testing. Dee is feeling more uncomfortable and feels that baby is lower in pelvis. Reports good fetal movement. Denies leaking of fluid, vaginal bleeding, regular uterine contractions, headache, visual changes, or other concerns. Return to clinic in 1 week.    DIANA BenoitM

## 2020-07-14 ENCOUNTER — HOSPITAL ENCOUNTER (OUTPATIENT)
Facility: CLINIC | Age: 35
End: 2020-07-14
Payer: COMMERCIAL

## 2020-07-14 PROBLEM — Z23 NEED FOR TDAP VACCINATION: Status: ACTIVE | Noted: 2019-12-10

## 2020-07-17 ENCOUNTER — PRENATAL OFFICE VISIT (OUTPATIENT)
Dept: MIDWIFE SERVICES | Facility: CLINIC | Age: 35
End: 2020-07-17
Payer: COMMERCIAL

## 2020-07-17 VITALS
WEIGHT: 222.9 LBS | BODY MASS INDEX: 38.06 KG/M2 | DIASTOLIC BLOOD PRESSURE: 77 MMHG | HEART RATE: 97 BPM | TEMPERATURE: 98.4 F | SYSTOLIC BLOOD PRESSURE: 123 MMHG | HEIGHT: 64 IN

## 2020-07-17 DIAGNOSIS — O09.513 AMA (ADVANCED MATERNAL AGE) PRIMIGRAVIDA 35+, THIRD TRIMESTER: Primary | ICD-10-CM

## 2020-07-17 PROCEDURE — 99207 ZZC PRENATAL VISIT: CPT | Performed by: ADVANCED PRACTICE MIDWIFE

## 2020-07-17 ASSESSMENT — MIFFLIN-ST. JEOR: SCORE: 1682.1

## 2020-07-17 NOTE — PROGRESS NOTES
Dee is tired of being pregnany.  Not specific issue but wants to be done, get body back to normal.  Is not having ctx.  BP is better today vs trend over the last few weeks.  No significant fluid retention.  Denies headache or malaise.  ASSESSMENT: 39w1d  Suspected macrosomia by US.  PLAN:  RTC in 1 wk.   Call prn.   DAIJA

## 2020-07-24 ENCOUNTER — PRENATAL OFFICE VISIT (OUTPATIENT)
Dept: MIDWIFE SERVICES | Facility: CLINIC | Age: 35
End: 2020-07-24
Payer: COMMERCIAL

## 2020-07-24 VITALS
OXYGEN SATURATION: 99 % | SYSTOLIC BLOOD PRESSURE: 132 MMHG | TEMPERATURE: 98.7 F | WEIGHT: 219.8 LBS | BODY MASS INDEX: 38.03 KG/M2 | HEART RATE: 116 BPM | DIASTOLIC BLOOD PRESSURE: 86 MMHG

## 2020-07-24 DIAGNOSIS — O48.0 POST-TERM PREGNANCY, 40-42 WEEKS OF GESTATION: ICD-10-CM

## 2020-07-24 DIAGNOSIS — O09.513 AMA (ADVANCED MATERNAL AGE) PRIMIGRAVIDA 35+, THIRD TRIMESTER: Primary | ICD-10-CM

## 2020-07-24 LAB
ALT SERPL W P-5'-P-CCNC: 14 U/L (ref 0–50)
AST SERPL W P-5'-P-CCNC: 11 U/L (ref 0–45)
CREAT SERPL-MCNC: 0.45 MG/DL (ref 0.52–1.04)
CREAT UR-MCNC: 135 MG/DL
ERYTHROCYTE [DISTWIDTH] IN BLOOD BY AUTOMATED COUNT: 14.7 % (ref 10–15)
GFR SERPL CREATININE-BSD FRML MDRD: >90 ML/MIN/{1.73_M2}
HCT VFR BLD AUTO: 40.6 % (ref 35–47)
HGB BLD-MCNC: 13.4 G/DL (ref 11.7–15.7)
MCH RBC QN AUTO: 28.9 PG (ref 26.5–33)
MCHC RBC AUTO-ENTMCNC: 33 G/DL (ref 31.5–36.5)
MCV RBC AUTO: 88 FL (ref 78–100)
PLATELET # BLD AUTO: 155 10E9/L (ref 150–450)
PROT UR-MCNC: 0.2 G/L
PROT/CREAT 24H UR: 0.15 G/G CR (ref 0–0.2)
RBC # BLD AUTO: 4.63 10E12/L (ref 3.8–5.2)
WBC # BLD AUTO: 7.9 10E9/L (ref 4–11)

## 2020-07-24 PROCEDURE — 99207 ZZC PRENATAL VISIT: CPT | Performed by: ADVANCED PRACTICE MIDWIFE

## 2020-07-24 PROCEDURE — 82565 ASSAY OF CREATININE: CPT | Performed by: ADVANCED PRACTICE MIDWIFE

## 2020-07-24 PROCEDURE — 84450 TRANSFERASE (AST) (SGOT): CPT | Performed by: ADVANCED PRACTICE MIDWIFE

## 2020-07-24 PROCEDURE — 85027 COMPLETE CBC AUTOMATED: CPT | Performed by: ADVANCED PRACTICE MIDWIFE

## 2020-07-24 PROCEDURE — 36415 COLL VENOUS BLD VENIPUNCTURE: CPT | Performed by: ADVANCED PRACTICE MIDWIFE

## 2020-07-24 PROCEDURE — 84156 ASSAY OF PROTEIN URINE: CPT | Performed by: ADVANCED PRACTICE MIDWIFE

## 2020-07-24 PROCEDURE — 59426 ANTEPARTUM CARE ONLY: CPT | Performed by: ADVANCED PRACTICE MIDWIFE

## 2020-07-24 PROCEDURE — 84460 ALANINE AMINO (ALT) (SGPT): CPT | Performed by: ADVANCED PRACTICE MIDWIFE

## 2020-07-24 NOTE — PROGRESS NOTES
40w1d  Dee is here by herself. Very emotional. She verbalizes anxiety, and wishes her partner could be with her in clinic. Discussed her initial high BP today can indicate onset of GHTN or preeclampsia. Discussed that the diagnosis is not made until 2 BP's at least 4 hours apart, recommend labs today and recheck BP on Mon. Discussed emergency s/s including headache, vision changes, RUQ pain. She has +2 edema in her feet, which has not gotten better or worse since about 36-38 weeks. She is very uncomfortable, but does not want to be induced. Discussed 41w options, including PD testing or induction of labor. She would like PD testing, ultrasound ordered. Declines SVE or membrane sweep today. Discussed that she can change her mind and choose induction of labor at anytiime after 41w, but that if labs have changed, there would be recommendation for induction of labor sooner. She is still really hoping to try a water birth. Return to care Mon for BP check and Thurs for PD testing.  Joshua Au CNM

## 2020-07-27 ENCOUNTER — MYC MEDICAL ADVICE (OUTPATIENT)
Dept: MIDWIFE SERVICES | Facility: CLINIC | Age: 35
End: 2020-07-27

## 2020-07-27 NOTE — TELEPHONE ENCOUNTER
Please see [employer] message with attached BP reading. Patient is not able to come to clinic today for a BP check as discussed on Friday. Is she okay to keep her appointment on Friday and continue to monitor at home until then? Janee Roblero RN

## 2020-07-29 ENCOUNTER — ANESTHESIA (OUTPATIENT)
Dept: OBGYN | Facility: CLINIC | Age: 35
End: 2020-07-29
Payer: COMMERCIAL

## 2020-07-29 ENCOUNTER — ANESTHESIA EVENT (OUTPATIENT)
Dept: OBGYN | Facility: CLINIC | Age: 35
End: 2020-07-29
Payer: COMMERCIAL

## 2020-07-29 ENCOUNTER — HOSPITAL ENCOUNTER (INPATIENT)
Facility: CLINIC | Age: 35
LOS: 2 days | Discharge: HOME HEALTH | End: 2020-07-31
Attending: ADVANCED PRACTICE MIDWIFE | Admitting: ADVANCED PRACTICE MIDWIFE
Payer: COMMERCIAL

## 2020-07-29 PROBLEM — Z36.89 ENCOUNTER FOR TRIAGE IN PREGNANT PATIENT: Status: ACTIVE | Noted: 2020-07-29

## 2020-07-29 LAB
ABO + RH BLD: NORMAL
ABO + RH BLD: NORMAL
BLD GP AB SCN SERPL QL: NORMAL
BLOOD BANK CMNT PATIENT-IMP: NORMAL
HGB BLD-MCNC: 13.7 G/DL (ref 11.7–15.7)
LABORATORY COMMENT REPORT: NORMAL
PLATELET # BLD AUTO: 194 10E9/L (ref 150–450)
RUPTURE OF FETAL MEMBRANES BY ROM PLUS: POSITIVE
SARS-COV-2 RNA SPEC QL NAA+PROBE: NEGATIVE
SARS-COV-2 RNA SPEC QL NAA+PROBE: NORMAL
SPECIMEN EXP DATE BLD: NORMAL
SPECIMEN SOURCE: NORMAL
SPECIMEN SOURCE: NORMAL
T PALLIDUM AB SER QL: NONREACTIVE

## 2020-07-29 PROCEDURE — 86850 RBC ANTIBODY SCREEN: CPT | Performed by: ADVANCED PRACTICE MIDWIFE

## 2020-07-29 PROCEDURE — U0003 INFECTIOUS AGENT DETECTION BY NUCLEIC ACID (DNA OR RNA); SEVERE ACUTE RESPIRATORY SYNDROME CORONAVIRUS 2 (SARS-COV-2) (CORONAVIRUS DISEASE [COVID-19]), AMPLIFIED PROBE TECHNIQUE, MAKING USE OF HIGH THROUGHPUT TECHNOLOGIES AS DESCRIBED BY CMS-2020-01-R: HCPCS | Performed by: ADVANCED PRACTICE MIDWIFE

## 2020-07-29 PROCEDURE — 25800030 ZZH RX IP 258 OP 636: Performed by: ADVANCED PRACTICE MIDWIFE

## 2020-07-29 PROCEDURE — 00HU33Z INSERTION OF INFUSION DEVICE INTO SPINAL CANAL, PERCUTANEOUS APPROACH: ICD-10-PCS | Performed by: ANESTHESIOLOGY

## 2020-07-29 PROCEDURE — 25000132 ZZH RX MED GY IP 250 OP 250 PS 637: Performed by: ADVANCED PRACTICE MIDWIFE

## 2020-07-29 PROCEDURE — 25000125 ZZHC RX 250: Performed by: STUDENT IN AN ORGANIZED HEALTH CARE EDUCATION/TRAINING PROGRAM

## 2020-07-29 PROCEDURE — G0463 HOSPITAL OUTPT CLINIC VISIT: HCPCS

## 2020-07-29 PROCEDURE — 25000128 H RX IP 250 OP 636: Performed by: STUDENT IN AN ORGANIZED HEALTH CARE EDUCATION/TRAINING PROGRAM

## 2020-07-29 PROCEDURE — 0DQR0ZZ REPAIR ANAL SPHINCTER, OPEN APPROACH: ICD-10-PCS | Performed by: OBSTETRICS & GYNECOLOGY

## 2020-07-29 PROCEDURE — 12000001 ZZH R&B MED SURG/OB UMMC

## 2020-07-29 PROCEDURE — 25000125 ZZHC RX 250: Performed by: ADVANCED PRACTICE MIDWIFE

## 2020-07-29 PROCEDURE — 36415 COLL VENOUS BLD VENIPUNCTURE: CPT | Performed by: ADVANCED PRACTICE MIDWIFE

## 2020-07-29 PROCEDURE — 86900 BLOOD TYPING SEROLOGIC ABO: CPT | Performed by: ADVANCED PRACTICE MIDWIFE

## 2020-07-29 PROCEDURE — 3E0R3BZ INTRODUCTION OF ANESTHETIC AGENT INTO SPINAL CANAL, PERCUTANEOUS APPROACH: ICD-10-PCS | Performed by: ANESTHESIOLOGY

## 2020-07-29 PROCEDURE — 86780 TREPONEMA PALLIDUM: CPT | Performed by: ADVANCED PRACTICE MIDWIFE

## 2020-07-29 PROCEDURE — 84112 EVAL AMNIOTIC FLUID PROTEIN: CPT | Performed by: ADVANCED PRACTICE MIDWIFE

## 2020-07-29 PROCEDURE — 85049 AUTOMATED PLATELET COUNT: CPT | Performed by: ADVANCED PRACTICE MIDWIFE

## 2020-07-29 PROCEDURE — 86901 BLOOD TYPING SEROLOGIC RH(D): CPT | Performed by: ADVANCED PRACTICE MIDWIFE

## 2020-07-29 PROCEDURE — 85018 HEMOGLOBIN: CPT | Performed by: ADVANCED PRACTICE MIDWIFE

## 2020-07-29 RX ORDER — TERBUTALINE SULFATE 1 MG/ML
0.25 INJECTION, SOLUTION SUBCUTANEOUS
Status: DISCONTINUED | OUTPATIENT
Start: 2020-07-29 | End: 2020-07-30

## 2020-07-29 RX ORDER — LIDOCAINE HYDROCHLORIDE 10 MG/ML
INJECTION, SOLUTION EPIDURAL; INFILTRATION; INTRACAUDAL; PERINEURAL
Status: DISCONTINUED
Start: 2020-07-29 | End: 2020-07-30 | Stop reason: HOSPADM

## 2020-07-29 RX ORDER — FENTANYL/BUPIVACAINE/NS/PF 2-1250MCG
PLASTIC BAG, INJECTION (ML) INJECTION
Status: DISCONTINUED
Start: 2020-07-29 | End: 2020-07-30 | Stop reason: HOSPADM

## 2020-07-29 RX ORDER — CALCIUM CARBONATE 500 MG/1
500-1000 TABLET, CHEWABLE ORAL DAILY PRN
Status: DISCONTINUED | OUTPATIENT
Start: 2020-07-29 | End: 2020-07-30

## 2020-07-29 RX ORDER — OMEPRAZOLE
20 KIT
Status: DISCONTINUED | OUTPATIENT
Start: 2020-07-29 | End: 2020-07-29

## 2020-07-29 RX ORDER — ONDANSETRON 2 MG/ML
4 INJECTION INTRAMUSCULAR; INTRAVENOUS EVERY 6 HOURS PRN
Status: DISCONTINUED | OUTPATIENT
Start: 2020-07-29 | End: 2020-07-30

## 2020-07-29 RX ORDER — NALOXONE HYDROCHLORIDE 0.4 MG/ML
.1-.4 INJECTION, SOLUTION INTRAMUSCULAR; INTRAVENOUS; SUBCUTANEOUS
Status: DISCONTINUED | OUTPATIENT
Start: 2020-07-29 | End: 2020-07-30

## 2020-07-29 RX ORDER — ACETAMINOPHEN 325 MG/1
650 TABLET ORAL EVERY 4 HOURS PRN
Status: DISCONTINUED | OUTPATIENT
Start: 2020-07-29 | End: 2020-07-30

## 2020-07-29 RX ORDER — OMEPRAZOLE
20 KIT
Status: DISCONTINUED | OUTPATIENT
Start: 2020-07-29 | End: 2020-07-30

## 2020-07-29 RX ORDER — FENTANYL CITRATE 50 UG/ML
50-100 INJECTION, SOLUTION INTRAMUSCULAR; INTRAVENOUS
Status: DISCONTINUED | OUTPATIENT
Start: 2020-07-29 | End: 2020-07-30

## 2020-07-29 RX ORDER — NALBUPHINE HYDROCHLORIDE 20 MG/ML
2.5-5 INJECTION, SOLUTION INTRAMUSCULAR; INTRAVENOUS; SUBCUTANEOUS EVERY 6 HOURS PRN
Status: DISCONTINUED | OUTPATIENT
Start: 2020-07-29 | End: 2020-07-30

## 2020-07-29 RX ORDER — LIDOCAINE HYDROCHLORIDE AND EPINEPHRINE 15; 5 MG/ML; UG/ML
INJECTION, SOLUTION EPIDURAL PRN
Status: DISCONTINUED | OUTPATIENT
Start: 2020-07-29 | End: 2020-07-30 | Stop reason: HOSPADM

## 2020-07-29 RX ORDER — OXYTOCIN 10 [USP'U]/ML
INJECTION, SOLUTION INTRAMUSCULAR; INTRAVENOUS
Status: DISCONTINUED
Start: 2020-07-29 | End: 2020-07-30 | Stop reason: HOSPADM

## 2020-07-29 RX ORDER — OXYTOCIN/0.9 % SODIUM CHLORIDE 30/500 ML
100-340 PLASTIC BAG, INJECTION (ML) INTRAVENOUS CONTINUOUS PRN
Status: COMPLETED | OUTPATIENT
Start: 2020-07-29 | End: 2020-07-30

## 2020-07-29 RX ORDER — MISOPROSTOL 200 UG/1
TABLET ORAL
Status: DISCONTINUED
Start: 2020-07-29 | End: 2020-07-30 | Stop reason: HOSPADM

## 2020-07-29 RX ORDER — FENTANYL CITRATE 50 UG/ML
INJECTION, SOLUTION INTRAMUSCULAR; INTRAVENOUS PRN
Status: DISCONTINUED | OUTPATIENT
Start: 2020-07-29 | End: 2020-07-30 | Stop reason: HOSPADM

## 2020-07-29 RX ORDER — CARBOPROST TROMETHAMINE 250 UG/ML
250 INJECTION, SOLUTION INTRAMUSCULAR
Status: DISCONTINUED | OUTPATIENT
Start: 2020-07-29 | End: 2020-07-30

## 2020-07-29 RX ORDER — EPHEDRINE SULFATE 50 MG/ML
5 INJECTION, SOLUTION INTRAMUSCULAR; INTRAVENOUS; SUBCUTANEOUS
Status: DISCONTINUED | OUTPATIENT
Start: 2020-07-29 | End: 2020-07-30

## 2020-07-29 RX ORDER — OXYTOCIN/0.9 % SODIUM CHLORIDE 30/500 ML
1-24 PLASTIC BAG, INJECTION (ML) INTRAVENOUS CONTINUOUS
Status: DISCONTINUED | OUTPATIENT
Start: 2020-07-29 | End: 2020-07-30

## 2020-07-29 RX ORDER — LIDOCAINE 40 MG/G
CREAM TOPICAL
Status: DISCONTINUED | OUTPATIENT
Start: 2020-07-29 | End: 2020-07-30

## 2020-07-29 RX ORDER — METHYLERGONOVINE MALEATE 0.2 MG/ML
200 INJECTION INTRAVENOUS
Status: DISCONTINUED | OUTPATIENT
Start: 2020-07-29 | End: 2020-07-30

## 2020-07-29 RX ORDER — OXYTOCIN/0.9 % SODIUM CHLORIDE 30/500 ML
PLASTIC BAG, INJECTION (ML) INTRAVENOUS
Status: DISCONTINUED
Start: 2020-07-29 | End: 2020-07-30 | Stop reason: HOSPADM

## 2020-07-29 RX ORDER — IBUPROFEN 800 MG/1
800 TABLET, FILM COATED ORAL
Status: DISCONTINUED | OUTPATIENT
Start: 2020-07-29 | End: 2020-07-30

## 2020-07-29 RX ORDER — OXYTOCIN 10 [USP'U]/ML
10 INJECTION, SOLUTION INTRAMUSCULAR; INTRAVENOUS
Status: DISCONTINUED | OUTPATIENT
Start: 2020-07-29 | End: 2020-07-30

## 2020-07-29 RX ORDER — OXYCODONE AND ACETAMINOPHEN 5; 325 MG/1; MG/1
1 TABLET ORAL
Status: DISCONTINUED | OUTPATIENT
Start: 2020-07-29 | End: 2020-07-30

## 2020-07-29 RX ORDER — EPHEDRINE SULFATE 50 MG/ML
INJECTION, SOLUTION INTRAMUSCULAR; INTRAVENOUS; SUBCUTANEOUS
Status: DISCONTINUED
Start: 2020-07-29 | End: 2020-07-30 | Stop reason: HOSPADM

## 2020-07-29 RX ORDER — SODIUM CHLORIDE, SODIUM LACTATE, POTASSIUM CHLORIDE, CALCIUM CHLORIDE 600; 310; 30; 20 MG/100ML; MG/100ML; MG/100ML; MG/100ML
INJECTION, SOLUTION INTRAVENOUS CONTINUOUS
Status: DISCONTINUED | OUTPATIENT
Start: 2020-07-29 | End: 2020-07-30

## 2020-07-29 RX ADMIN — Medication: at 12:57

## 2020-07-29 RX ADMIN — CALCIUM CARBONATE (ANTACID) CHEW TAB 500 MG 1000 MG: 500 CHEW TAB at 18:17

## 2020-07-29 RX ADMIN — LIDOCAINE HYDROCHLORIDE,EPINEPHRINE BITARTRATE 2 ML: 15; .005 INJECTION, SOLUTION EPIDURAL; INFILTRATION; INTRACAUDAL; PERINEURAL at 12:47

## 2020-07-29 RX ADMIN — OMEPRAZOLE 20 MG: KIT at 22:33

## 2020-07-29 RX ADMIN — SODIUM CHLORIDE, POTASSIUM CHLORIDE, SODIUM LACTATE AND CALCIUM CHLORIDE 500 ML: 600; 310; 30; 20 INJECTION, SOLUTION INTRAVENOUS at 22:12

## 2020-07-29 RX ADMIN — Medication 8 ML: at 12:51

## 2020-07-29 RX ADMIN — Medication: at 20:13

## 2020-07-29 RX ADMIN — SODIUM CHLORIDE, POTASSIUM CHLORIDE, SODIUM LACTATE AND CALCIUM CHLORIDE: 600; 310; 30; 20 INJECTION, SOLUTION INTRAVENOUS at 20:52

## 2020-07-29 RX ADMIN — FENTANYL CITRATE 16 MCG: 50 INJECTION, SOLUTION INTRAMUSCULAR; INTRAVENOUS at 12:51

## 2020-07-29 RX ADMIN — Medication 2 MILLI-UNITS/MIN: at 17:42

## 2020-07-29 RX ADMIN — SODIUM CHLORIDE, POTASSIUM CHLORIDE, SODIUM LACTATE AND CALCIUM CHLORIDE: 600; 310; 30; 20 INJECTION, SOLUTION INTRAVENOUS at 13:13

## 2020-07-29 RX ADMIN — SODIUM CHLORIDE, POTASSIUM CHLORIDE, SODIUM LACTATE AND CALCIUM CHLORIDE 1000 ML: 600; 310; 30; 20 INJECTION, SOLUTION INTRAVENOUS at 12:06

## 2020-07-29 ASSESSMENT — MIFFLIN-ST. JEOR: SCORE: 1657.04

## 2020-07-29 NOTE — H&P
ADMISSION NOTE FOR Dee Mendez on 7/29/2020.    Dee Mendez is a 35 year old female     woman.      Estimated Date of Delivery: Jul 23, 2020 is calculated from Patient's last menstrual period was 10/17/2019. is [facility] 7/29/2020 at 8:00 AM  in early labor.     Membranes are ruptured since 0700 and verified with  ROM plus.     Labor start time N/A.     PRENATAL COURSE   Prenatal care began at 7 wks gestation for a total of 13 prenatal visit.  Total wt gain 11#   Prenatal vital signs with elevated BP and edema was 1+   Prenatal course was   complicated by    Patient Active Problem List    Diagnosis Date Noted     Encounter for triage in pregnant patient 07/29/2020     Priority: Medium     Labor and delivery indication for care or intervention 07/29/2020     Priority: Medium     AMA (advanced maternal age) multigravida 35+ 02/27/2020     Priority: Medium     FOB: Corey   Declined genetic testing   AMA   2/27/20 MFM U/S;  19 0/7 weeks, posterior placenta, male, ENRIKE: 7/23/20, MFP 3.3       Need for Tdap vaccination 12/10/2019     Priority: Medium     Tdap given 5/6/2020       Morbid obesity due to excess calories (H) 10/31/2016     Priority: Medium     CARDIOVASCULAR SCREENING; LDL GOAL LESS THAN 160 05/09/2010     Priority: Medium     Hypertrophic and atrophic condition of skin 03/21/2006     Priority: Medium     Problem list name updated by automated process. Provider to review       Allergic rhinitis 03/21/2006     Priority: Medium     Problem list name updated by automated process. Provider to review          HISTORIES   Allergies   Allergen Reactions     Adhesive Tape      Bandaids irritate skin     Amoxicillin      vaginal irritation after 3-4 days     Azithromycin      zithromax--migraines and stomach cramps     Corn-Related Products      Sulfamethoxazole W/Trimethoprim      GI     Food Nausea     Food intolerance test taken corn ,soy , potato, bananas, carrot, lettuce,  bakers yeast, brewers yeast,   Also lactose intolerant, checked on  On 4/16/15  Also eggs dont make her feel well but not officially checked      Liquid Adhesive Rash      Past Medical History:   Diagnosis Date     ASCUS favor benign 3/2006    neg HPV     Morbid obesity due to excess calories (H) 10/31/2016     Nexplanon insertion 2014    Implanon removed and replaced with Nexplanon nexplanon lot# [id number]  exp:  10/16      Streptococcal sore throat     recurrent pharyngitis     UTI 2003      Past Surgical History:   Procedure Laterality Date     implanon  12      Family History   Problem Relation Age of Onset     Asthma Mother      Heart Failure Mother      Hypertension Mother      Chronic Obstructive Pulmonary Disease Mother      Diabetes Mother      Hypertension Father      Lipids Father      Gastrointestinal Disease Father      Lung Cancer Father      Cancer Maternal Grandmother         d:  age 53; lung with mets; smoker     Neurologic Disorder Maternal Aunt         b:  1967 ; MS     Seizure Disorder Brother         adolescents     Colon Cancer Paternal Grandmother       Social History     Tobacco Use     Smoking status: Never Smoker     Smokeless tobacco: Never Used   Substance Use Topics     Alcohol use: Not Currently     Comment: occ      OB History    Para Term  AB Living   1 0 0 0 0 0   SAB TAB Ectopic Multiple Live Births   0 0 0 0 0      # Outcome Date GA Lbr Nacho/2nd Weight Sex Delivery Anes PTL Lv   1 Current                 LABS:     Lab Results   Component Value Date    ABO A 12/10/2019           Lab Results   Component Value Date    RH Pos 12/10/2019      Rhogam not indicated   No results found for: RUBELLAABIGG   No results found for: RPR   No results found for: HIV   Lab Results   Component Value Date    HGB 13.4 2020      Lab Results   Component Value Date    HEPBANG Nonreactive 12/10/2019      Lab Results   Component Value Date    GBS Negative 2020       Other significant lab:       ROS   Review Of Systems  Skin: negative  Eyes: negative  Ears/Nose/Throat: negative  Respiratory: No shortness of breath, dyspnea on exertion, cough, or hemoptysis  Cardiovascular: negative  Gastrointestinal: negative  Genitourinary: negative  Musculoskeletal: negative  Neurologic: negative  Psychiatric: positive for feeling anxious  Hematologic/Lymphatic/Immunologic: negative  Endocrine: negative     PHYSICAL EXAM:   Vital signs stable, afebrile and BP is   BP Readings from Last 3 Encounters:   07/29/20 136/76   07/24/20 132/86   07/17/20 123/77      General appearance healthy, alert, active, moderate distress and cooperative   Heart: RRR without murmur   Lungs: clear to auscultation   Neuro: denies headache and visual disturbances   Psych: Mentation normal and bright   Legs: 2+/2+, no clonus, no edema        Abdomen: gravid, single vertex fetus, non-tender, EFW 9 lbs 14 .      37 week US on 7/2/20  Impression:  Cephalic fetus.  EFW 8# 3oz, 3719 gm, 96th growth percentile.  The A/C measures at the >99th percentile.  MVP 5.3 cm.     Pt is artem every 3 minutes, lasting 50 seconds and palpates moderate     FETAL HEART TONES: baseline 140 .  moderate FHRV variability.  No late decelerations or variable decelerations noted.  Category I fetal heart rate tracing.     PELVIC EXAM: 2.5/ 70/ Posterior/ soft/ -1    Unable to exam cervix but front edge is very posterior and thus no more that 2-3 cm and 70% based on front edge of cervix  BLOODY SHOW:: no   Membranes as listed above.     ASSESSMENT:   IUP @ 40w6d in early labor.  NST reactive.  Category  I  CST negative.  Maternal status is stable.   Macrosomia based on 37 wk US and EFW      PLAN:   Admit - see IP orders  Pain medication prn  MD consultant on call is Dr Nair who will be given information on EFW and increased risk for C/S PPH based on that risk factor / available prn         Jesus Arenas CNM

## 2020-07-29 NOTE — ANESTHESIA PREPROCEDURE EVALUATION
"Anesthesia Pre-Procedure Evaluation    Patient: Dee Mendez   MRN:     [id number] Gender:   female   Age:    35 year old :      1985        Preoperative Diagnosis: * No surgery found *        LABS:  CBC:   Lab Results   Component Value Date    WBC 7.9 2020    WBC 7.6 12/10/2019    HGB 13.7 2020    HGB 13.4 2020    HCT 40.6 2020    HCT 41.6 12/10/2019     2020     2020     BMP:   Lab Results   Component Value Date     10/25/2016    POTASSIUM 3.9 10/25/2016    CHLORIDE 107 10/25/2016    CO2 21 10/25/2016    BUN 14 10/25/2016    CR 0.45 (L) 2020    CR 0.68 10/25/2016    GLC 79 10/25/2016     COAGS: No results found for: PTT, INR, FIBR  POC:   Lab Results   Component Value Date    HCG Negative 2014     OTHER:   Lab Results   Component Value Date    MIKAEL 8.8 10/25/2016    ALBUMIN 4.1 10/25/2016    PROTTOTAL 8.0 10/25/2016    ALT 14 2020    AST 11 2020    ALKPHOS 88 10/25/2016    BILITOTAL 0.5 10/25/2016    TSH 0.84 10/25/2016        Preop Vitals    BP Readings from Last 3 Encounters:   20 136/76   20 132/86   20 123/77    Pulse Readings from Last 3 Encounters:   20 99   20 116   20 97      Resp Readings from Last 3 Encounters:   20 18   11 14   11 20    SpO2 Readings from Last 3 Encounters:   20 99%   20 98%   20 99%      Temp Readings from Last 1 Encounters:   20 36.6  C (97.9  F) (Oral)    Ht Readings from Last 1 Encounters:   20 1.6 m (5' 3\")      Wt Readings from Last 1 Encounters:   20 99.8 kg (220 lb)    Estimated body mass index is 38.97 kg/m  as calculated from the following:    Height as of this encounter: 1.6 m (5' 3\").    Weight as of this encounter: 99.8 kg (220 lb).     LDA:  Peripheral IV 20 Anterior;Left Hand (Active)   Number of days: 0        Past Medical History:   Diagnosis Date     ASCUS favor benign 3/2006    neg HPV "     Morbid obesity due to excess calories (H) 10/31/2016     Nexplanon insertion 9/8/2014    Implanon removed and replaced with Nexplanon nexplanon lot# [id number]  exp:  10/16      Streptococcal sore throat     recurrent pharyngitis     UTI 9/19/2003      Past Surgical History:   Procedure Laterality Date     implanon  4/9/12      Allergies   Allergen Reactions     Adhesive Tape      Bandaids irritate skin     Amoxicillin      vaginal irritation after 3-4 days     Azithromycin      zithromax--migraines and stomach cramps     Corn-Related Products      Sulfamethoxazole W/Trimethoprim      GI     Food Nausea     Food intolerance test taken corn ,soy , potato, bananas, carrot, lettuce, bakers yeast, brewers yeast,   Also lactose intolerant, checked on  On 4/16/15  Also eggs dont make her feel well but not officially checked      Liquid Adhesive Rash        Anesthesia Evaluation       history and physical reviewed .      No history of anesthetic complications          ROS/MED HX    ENT/Pulmonary:     (+)asthma , . .    Neurologic:  - neg neurologic ROS     Cardiovascular:  - neg cardiovascular ROS       METS/Exercise Tolerance:     Hematologic:         Musculoskeletal:         GI/Hepatic:     (+) GERD       Renal/Genitourinary:         Endo:         Psychiatric:         Infectious Disease:         Malignancy:         Other:                     JZG FV AN PHYSICAL EXAM    Assessment:   ASA SCORE: 2 emergent           PONV Management: Adult Risk Factors: Female             neg OB ROS             Peggy Valdovinos MD

## 2020-07-29 NOTE — PLAN OF CARE
Data: Patient presented to [facility] at 0734.   Reason for maternal/fetal assessment per patient is Contractions  .  Patient is a . Prenatal record reviewed.      OB History    Para Term  AB Living   1 0 0 0 0 0   SAB TAB Ectopic Multiple Live Births   0 0 0 0 0      # Outcome Date GA Lbr Nacho/2nd Weight Sex Delivery Anes PTL Lv   1 Current            . Medical history:   Past Medical History:   Diagnosis Date    ASCUS favor benign 3/2006    neg HPV    Morbid obesity due to excess calories (H) 10/31/2016    Nexplanon insertion 2014    Implanon removed and replaced with Nexplanon nexplanon lot# [id number]  exp:  10/16     Streptococcal sore throat     recurrent pharyngitis    UTI 2003   . Gestational Age 40w6d. VSS. Fetal movement present. Patient denies cramping, backache, vaginal discharge, pelvic pressure, UTI symptoms, GI problems, bloody show, vaginal bleeding, edema, headache, visual disturbances, epigastric or URQ pain, abdominal pain, rupture of membranes. Support persons rahul spouse present.  Action: Verbal consent for EFM. Triage assessment completed. EFM applied for fetal assessment. Uterine assessment by Helen. Fetal assessment: Presumed adequate fetal oxygenation documented (see flow record).   Response: CNM Pullman informed of patient's arrival. Plan per provider is to admit for labor. Patient verbalized agreement with plan. Patient transferred to room 442 ambulatory, oriented to room and call light.

## 2020-07-29 NOTE — ANESTHESIA PROCEDURE NOTES
Combined Spinal/Epidural procedure note    Staff -   Anesthesiologist:  Lacy Nettles MD  Resident/Fellow: Peggy Valdovinos MD    Performed By: resident  Procedure performed by resident/CRNA in presence of a teaching physician.        Location:  OB  Timeout  patient identified, IV checked, site marked, risks and benefits discussed, informed consent, monitors and equipment checked, pre-op evaluation, at physician/surgeon's request and post-op pain management    Correct Patient: Yes    Correct Position: Yes    Correct Site: Yes    Correct Procedure: Yes    Correct Laterality:  Yes  Site Marked:  Yes    Procedure details  Procedure:  Combined Spinal/Epidural (CSE)  Position:  Sitting  ASA:  2 and Emergent  Sterile Prep: chloraprep    Local skin infiltration:  1% lidocaine  amount (mL):  3  Approach:  Midline  Epidural Needle Type:  Touhy  Needle gauge (G):  17  Needle length (in):  3.5  Injection Technique:  LORT saline and LORT air  ALMAS at (cm):  7  Attempts:  1  Redirects:  1  Spinal Needle (G):  25  Spinal Needle Type:  Pencan  Spinal Needle Length (in):  5  Catheter gauge (G):  19  Catheter threaded easily: Yes    Threaded to skin (cm) :  12  Threaded in epidural space (cm):  5  Paresthesias:  No  CSF with Epidural needle: No    CSF with Spinal needle: Yes    Aspiration negative for Heme or CSF: Yes    Test dose (mL):  3  Local anesthetic for test dose:  Lidocaine 1.5% w/ 1:200,000 epinephrine  Test dose negative for signs of intravascular, subdural or intrathecal injection: Yes     Dural puncture epidural performed at L3-4, no medicine injected into the spinal space. Patient tolerated the procedure well.

## 2020-07-29 NOTE — PROGRESS NOTES
"SUBJECTIVE:  Back pain is unbearable.  Desires an epidural    OBJECTIVE:  General appearance:  healthy, alert, severe distress, cooperative, pale, flushed and crying.     uncomfortable with contractions      Blood pressure 136/76, pulse 99, temperature 97.9  F (36.6  C), temperature source Oral, resp. rate 18, height 1.6 m (5' 3\"), weight 99.8 kg (220 lb), last menstrual period 10/17/2019, not currently breastfeeding.    FETAL HEART TONES: baseline 130 .  moderate FHRV variability.  No late decelerations bradycardia or marked variable decelerations noted.    CONTACTIONS: Contractions every 3 minutes.  Palpate: moderate  Pitocin- none,    GBS- negative     Antibiotics- none             PELVIC EXAM:  Deferred.     ROM: clear fluid x 5 hrs    ASSESSMENT:  ==============  IUP @ 40w6d  Diagnosis active labor     Labor:  spontaneous  Category I fetal heart rate tracing.   Macrosomia suspected     PLAN:  ===========  comfort measures prn   Pain medication by epidural at 1245  Anticipate   reevaluate in 2-4 hours/PRN     Jesus Arenas, APRN CNM    "

## 2020-07-29 NOTE — PROGRESS NOTES
"S: Doing well. Comfortable with epidural. Wants to try and nap, did not sleep last night. Discussed cervical check, 3/90/-2 clear fluid. Her contractions have spaced out per the toco. Will order pitocin to help keep them regular. Discussed pitocin with Dee and Cristian. They report understanding and agree with plan. No other questions or concerns.     O:  Blood pressure 118/65, pulse 99, temperature 99  F (37.2  C), temperature source Oral, resp. rate 16, height 1.6 m (5' 3\"), weight 99.8 kg (220 lb), last menstrual period 10/17/2019, SpO2 97 %, not currently breastfeeding.  General appearance: comfortable    CONTACTIONS: Contractions every 2-7 minutes.  Palpate: moderate  FETAL HEART TONES: baseline 125 with moderate FHR variability and    accelerations yes. Decelerations no.    NST: REACTIVE  ROM: clear fluid  PELVIC EXAM: PELVIC EXAM: 3/ 90/ Mid/ soft/ -2   Fetal Position: cephalic   Bloody show: No  Pitocin- none,  Antibiotics- none  Cervical ripening: N/A    ASSESSMENT:  ==============  IUP @ 40w6d early labor   Fetal Heart rate tracing category one  GBS- negative     PLAN:  ===========  Comfort measures prn   Pain medication with epidural   Anticipate   Labor augmentation with Pitocin per protocol   Reevaluate in 2-4 hours/PRN     DIANA Lopez CNM    "

## 2020-07-29 NOTE — PLAN OF CARE
Patient came laboring on her own. SROM this morning with clear fluid. Vital signs are stable. Requested and received epidural with good pain control. Anticipate .

## 2020-07-30 PROCEDURE — 12000001 ZZH R&B MED SURG/OB UMMC

## 2020-07-30 PROCEDURE — 25000128 H RX IP 250 OP 636: Performed by: STUDENT IN AN ORGANIZED HEALTH CARE EDUCATION/TRAINING PROGRAM

## 2020-07-30 PROCEDURE — 59410 OBSTETRICAL CARE: CPT | Performed by: ADVANCED PRACTICE MIDWIFE

## 2020-07-30 PROCEDURE — 25000125 ZZHC RX 250: Performed by: ADVANCED PRACTICE MIDWIFE

## 2020-07-30 PROCEDURE — 59300 EPISIOTOMY OR VAGINAL REPAIR: CPT | Performed by: OBSTETRICS & GYNECOLOGY

## 2020-07-30 PROCEDURE — 25000132 ZZH RX MED GY IP 250 OP 250 PS 637: Performed by: ADVANCED PRACTICE MIDWIFE

## 2020-07-30 PROCEDURE — 25800030 ZZH RX IP 258 OP 636: Performed by: STUDENT IN AN ORGANIZED HEALTH CARE EDUCATION/TRAINING PROGRAM

## 2020-07-30 PROCEDURE — 25800030 ZZH RX IP 258 OP 636: Performed by: ADVANCED PRACTICE MIDWIFE

## 2020-07-30 PROCEDURE — 72200001 ZZH LABOR CARE VAGINAL DELIVERY SINGLE

## 2020-07-30 PROCEDURE — 40000977 ZZH STATISTIC ATTENDANCE AT DELIVERY

## 2020-07-30 RX ORDER — AMOXICILLIN 250 MG
1 CAPSULE ORAL 2 TIMES DAILY
Status: DISCONTINUED | OUTPATIENT
Start: 2020-07-30 | End: 2020-07-30

## 2020-07-30 RX ORDER — NALOXONE HYDROCHLORIDE 0.4 MG/ML
.1-.4 INJECTION, SOLUTION INTRAMUSCULAR; INTRAVENOUS; SUBCUTANEOUS
Status: DISCONTINUED | OUTPATIENT
Start: 2020-07-30 | End: 2020-07-31 | Stop reason: HOSPADM

## 2020-07-30 RX ORDER — BISACODYL 10 MG
10 SUPPOSITORY, RECTAL RECTAL DAILY PRN
Status: DISCONTINUED | OUTPATIENT
Start: 2020-08-01 | End: 2020-07-31 | Stop reason: HOSPADM

## 2020-07-30 RX ORDER — DOCUSATE SODIUM 100 MG/1
100 CAPSULE, LIQUID FILLED ORAL 2 TIMES DAILY
Status: DISCONTINUED | OUTPATIENT
Start: 2020-07-30 | End: 2020-07-31 | Stop reason: HOSPADM

## 2020-07-30 RX ORDER — IBUPROFEN 800 MG/1
800 TABLET, FILM COATED ORAL EVERY 6 HOURS
Status: DISCONTINUED | OUTPATIENT
Start: 2020-07-30 | End: 2020-07-30

## 2020-07-30 RX ORDER — AMOXICILLIN 250 MG
2 CAPSULE ORAL 2 TIMES DAILY
Status: DISCONTINUED | OUTPATIENT
Start: 2020-07-30 | End: 2020-07-30

## 2020-07-30 RX ORDER — HYDROCORTISONE 2.5 %
CREAM (GRAM) TOPICAL 3 TIMES DAILY PRN
Status: DISCONTINUED | OUTPATIENT
Start: 2020-07-30 | End: 2020-07-31 | Stop reason: HOSPADM

## 2020-07-30 RX ORDER — IBUPROFEN 800 MG/1
800 TABLET, FILM COATED ORAL EVERY 6 HOURS
Status: DISCONTINUED | OUTPATIENT
Start: 2020-07-30 | End: 2020-07-31 | Stop reason: HOSPADM

## 2020-07-30 RX ORDER — MODIFIED LANOLIN
OINTMENT (GRAM) TOPICAL
Status: DISCONTINUED | OUTPATIENT
Start: 2020-07-30 | End: 2020-07-31 | Stop reason: HOSPADM

## 2020-07-30 RX ORDER — POLYETHYLENE GLYCOL 3350 17 G/17G
17 POWDER, FOR SOLUTION ORAL DAILY
Status: DISCONTINUED | OUTPATIENT
Start: 2020-07-30 | End: 2020-07-31 | Stop reason: HOSPADM

## 2020-07-30 RX ORDER — IBUPROFEN 800 MG/1
800 TABLET, FILM COATED ORAL EVERY 6 HOURS PRN
Status: DISCONTINUED | OUTPATIENT
Start: 2020-07-30 | End: 2020-07-30

## 2020-07-30 RX ORDER — OXYTOCIN/0.9 % SODIUM CHLORIDE 30/500 ML
100 PLASTIC BAG, INJECTION (ML) INTRAVENOUS CONTINUOUS
Status: DISCONTINUED | OUTPATIENT
Start: 2020-07-30 | End: 2020-07-31 | Stop reason: HOSPADM

## 2020-07-30 RX ORDER — OXYTOCIN/0.9 % SODIUM CHLORIDE 30/500 ML
340 PLASTIC BAG, INJECTION (ML) INTRAVENOUS CONTINUOUS PRN
Status: DISCONTINUED | OUTPATIENT
Start: 2020-07-30 | End: 2020-07-31 | Stop reason: HOSPADM

## 2020-07-30 RX ORDER — ACETAMINOPHEN 325 MG/1
650 TABLET ORAL EVERY 4 HOURS PRN
Status: DISCONTINUED | OUTPATIENT
Start: 2020-07-30 | End: 2020-07-31 | Stop reason: HOSPADM

## 2020-07-30 RX ORDER — MISOPROSTOL 200 UG/1
400 TABLET ORAL
Status: DISCONTINUED | OUTPATIENT
Start: 2020-07-30 | End: 2020-07-31 | Stop reason: HOSPADM

## 2020-07-30 RX ORDER — OXYTOCIN 10 [USP'U]/ML
10 INJECTION, SOLUTION INTRAMUSCULAR; INTRAVENOUS
Status: DISCONTINUED | OUTPATIENT
Start: 2020-07-30 | End: 2020-07-31 | Stop reason: HOSPADM

## 2020-07-30 RX ADMIN — DOCUSATE SODIUM 100 MG: 100 CAPSULE, LIQUID FILLED ORAL at 19:28

## 2020-07-30 RX ADMIN — IBUPROFEN 800 MG: 800 TABLET, FILM COATED ORAL at 15:01

## 2020-07-30 RX ADMIN — POLYETHYLENE GLYCOL 3350 17 G: 17 POWDER, FOR SOLUTION ORAL at 09:07

## 2020-07-30 RX ADMIN — IBUPROFEN 800 MG: 800 TABLET, FILM COATED ORAL at 21:37

## 2020-07-30 RX ADMIN — SODIUM CHLORIDE, POTASSIUM CHLORIDE, SODIUM LACTATE AND CALCIUM CHLORIDE: 600; 310; 30; 20 INJECTION, SOLUTION INTRAVENOUS at 01:25

## 2020-07-30 RX ADMIN — ACETAMINOPHEN 650 MG: 325 TABLET, FILM COATED ORAL at 19:27

## 2020-07-30 RX ADMIN — Medication 340 ML/HR: at 07:20

## 2020-07-30 RX ADMIN — DOCUSATE SODIUM 100 MG: 100 CAPSULE, LIQUID FILLED ORAL at 09:03

## 2020-07-30 RX ADMIN — Medication: at 02:35

## 2020-07-30 RX ADMIN — IBUPROFEN 800 MG: 800 TABLET, FILM COATED ORAL at 09:03

## 2020-07-30 RX ADMIN — Medication 1 MILLI-UNITS/MIN: at 03:59

## 2020-07-30 RX ADMIN — ACETAMINOPHEN 650 MG: 325 TABLET, FILM COATED ORAL at 15:01

## 2020-07-30 RX ADMIN — SODIUM CHLORIDE, POTASSIUM CHLORIDE, SODIUM LACTATE AND CALCIUM CHLORIDE 500 ML: 600; 310; 30; 20 INJECTION, SOLUTION INTRAVENOUS at 03:09

## 2020-07-30 RX ADMIN — LIDOCAINE HYDROCHLORIDE 15 ML: 10 INJECTION, SOLUTION EPIDURAL; INFILTRATION; INTRACAUDAL; PERINEURAL at 07:34

## 2020-07-30 NOTE — PROVIDER NOTIFICATION
07/30/20 0315   Provider Notification   Provider Name/Title IFTIKHAR Seay CNM   Method of Notification Electronic Page   Notification Reason Decels   Prolonged decel to 90's at 0307. Repositioned, pitocin stopped, IV fluid bolus started. Pt also feeling more rectal pressure.

## 2020-07-30 NOTE — PROVIDER NOTIFICATION
07/30/20 0048   Provider Notification   Provider Name/Title IFTIKHAR Seay CNM   Method of Notification Phone   Notification Reason Labor Status   Pt feeling strong pelvic and rectal pressure, requesting SVE at this time.

## 2020-07-30 NOTE — PROCEDURES
Third degree laceration repair note.    Called by CNM to evaluate and repair third degree laceration following  and midline episiotomy by CNM.  Epidural in place, although patient with some pain despite epidural.    On examination, perineal laceration extended through rectal sphincter.  Area infiltrated with 1% lidocaine.  Sphincter was repaired with three figure of 8 stitches of 2-0 vicryl.  Rectal examination following sphincter repair confirmed good reapproximation and bulk of this muscle.  Attention then turned to remainder of laceration, which was repaired in standard fashion using 3-0 vicryl.      Laceration visually inspected following repair and found to be well approximated and hemostatic.    Reviewed importance of avoiding constipation and straining with the patient, as well as nothing in the vagina for 6 weeks.    Venita Qureshi MD

## 2020-07-30 NOTE — PROGRESS NOTES
Transfer note: Pt transferred from L&D to [facility] into room 7145 at 1015. Baby in pt's arms and given to FOB. Bands checked. Pt and FOB falling asleep. Will assign videos and do room and folder orientation later when more awake. Pt ambulated to bed and pitocin infusing.

## 2020-07-30 NOTE — PLAN OF CARE
Pt progressing with labor. Afebrile. BP mildly elevated. Leaking clear fluid, vaginal bleeding present. Epidural bolused at 0108 due to increased labor pain. Fully dilated at 0323. Pushing started at 0327. FHR Category 2 during pushing due to decelerations. Reposition, IV fluid bolus, and stopped pitocin used for intrauterine resuscitation. Provider aware and involved. IV replaced at 0700 following loss of IV access.  of male infant at 0718 following episiotomy with spontaneous cry, placed on mothers abdomen, delayed cord clamping. Stimulated, dried, warm blankets and hat applied. Handoff report given to JÚNIOR Farley at 0725.

## 2020-07-30 NOTE — PLAN OF CARE
VSS. AFebrile. Up ad stacy. Voiding and passing gas. Perineum swollen and using tucks and peribottle. Breast feeding with some assistance. C/o minimal pain and only using ibuprofen for now. Encouraged to do tub soak. FOB here off and on and supportive. Will continue to monitor.

## 2020-07-30 NOTE — PLAN OF CARE
Patient tolerating labor well. Epidural working well. IV pitocin started at 1745. Patient currently artem Q 2-4 minutes palpating moderate. FHT's Cat. 1. SROM at 0700 today. Clear fluid. Some bloody show. Frequent position changes implemented to help with fetal decent. Fetus measuring LGA. Shoulder dystosia prevention measures should be in place for delivery. Continue with current POC.

## 2020-07-30 NOTE — PLAN OF CARE
Data: Dee Mendez transferred to [place] [id number] via wheelchair at 1000. Baby transferred via parent's arms.  Action: Receiving unit notified of transfer: Yes. Patient and family notified of room change. Report given to Teri CALLEJAS at 0930. Belongings sent to receiving unit. Call light within reach. ID bands double-checked with receiving RN.  Response: Patient tolerated transfer and is stable.

## 2020-07-30 NOTE — L&D DELIVERY NOTE
OB Vaginal Delivery Note  Dee arrived with spontaneous labor and SROM with clear fluid. She received an epidural for pain management. Her contractions spaced out over the day and required pitocin augmentation. After augmenting her labor progress well. She started to push around 0330 after feeling a lot of pressure. She pushed well over the next 3.5 hours to delivery of baby boy. Baby boy placed on her abdomen with spontaneous respiration. NICU was on standby due to EFW and decelerations with pushing. NICU team was able to leave without needing to evaluate baby. Placenta delivered intact with three vessel cord. Perineum inspected and 3rd degree noted. Dr. Qureshi called to room for repair, please see her note for details. Mother and baby bonding and stable.     Dee Mendez MRN# [id number]   Age: 35 year old YOB: 1985       GA: 41w0d  GP:   Labor Complications: None   EBL:   mL  Delivery QBL:    Delivery Type: Vaginal, Spontaneous   ROM to Delivery Time: (Delivered) Days: 1 Minutes: 18  [place] Weight:     1 Minute 5 Minute 10 Minute   Apgar Totals: 7   9        CELT, JAY     Delivery Details:  Dee Mendez, a 35 year old  female delivered a viable infant with apgars of 7  and 9 . Patient was fully dilated and pushing after 18  hours 23  minutes in active labor. Delivery was via vaginal, spontaneous  to a sterile field under epidural  anesthesia. Infant delivered in vertex  left  occiput  anterior  position. Anterior and posterior shoulders delivered without difficulty. The cord was clamped, cut twice and 3 vessels  were noted. Cord blood was obtained in routine fashion with the following disposition: lab .      Cord complications: nuchal   Placenta delivered at 2020  7:38 AM . Placental disposition was Hospital disposal . Fundal massage performed and fundus found to be firm.     Episiotomy: none    Perineum, vagina, cervix were inspected, and the following lacerations were  noted:   Perineal lacerations: 3rd                Any lacerations were repaired in the usual fashion using 2-0 and 3-0.    Excellent hemostasis was noted. Needle count correct. Infant and patient in delivery room in good and stable condition.        Labor Event Times    Labor onset date:  20 Onset time:   9:00 AM   Dilation complete date:  20 Complete time:   3:23 AM   Start pushing date/time:  2020 0327      Labor Length    1st Stage (hrs):  18 (min):  23   2nd Stage (hrs):  3 (min):  55   3rd Stage (hrs):  0 (min):  20      Labor Events     labor?:  No   steroids:  None  Labor Type:  Spontaneous, Augmentation  Predominate monitoring during 1st stage:  continuous electronic fetal monitoring     Antibiotics received during labor?:  No     Rupture date/time: 20 0700   Rupture type:  Spontaneous rupture of membranes occuring during spontaneous labor or augmentation  Fluid color:  Clear  Fluid odor:  Normal     Augmentation:  Oxytocin  Augmentation date/time:  20 1745   Indications for augmentation:  Ineffective Contraction Pattern  1:1 continuous labor support provided by?:  RN Labor partogram used?:  no      Delivery/Placenta Date and Time    Delivery Date:  20 Delivery Time:   7:18 AM   Placenta Date/Time:  2020  7:38 AM  Oxytocin given at the time of delivery:  after delivery of baby     Vaginal Counts     Initial count performed by 2 team members:   Two Team Members   Celestina Peterson RN       Needles Suture [place] Sponges Instruments   Initial counts 2 2 5    Added to count       Final counts       Placed during labor Accounted for at the end of labor   NA NA   NA NA   NA NA           Apgars    Living status:  Living   1 Minute 5 Minute 10 Minute 15 Minute 20 Minute   Skin color: 1  1       Heart rate: 2  2       Reflex irritability: 1  2       Muscle tone: 2  2       Respiratory effort: 1  2       Total: 7  9       Apgars assigned by:  KARLEE  CLAYTON RNC     Cord    Vessels:  3 Vessels Complications:  Nuchal   Cord Blood Disposition:  Lab Gases Sent?:  No       Resuscitation    Methods:  None  [place] Care at Delivery:  Stimulate to cry, to mother for skin to skin and transition.   Output in Delivery Room:  Stool     Skin to Skin and Feeding Plan    Skin to skin initiation date/time: 1841    Skin to skin with:  Mother  Skin to skin end date/time:     How do you plan to feed your baby:  Breastfeeding     Labor Events and Shoulder Dystocia    Fetal Tracing Prior to Delivery:  Category 2  Fetal Tracing Comments:  fetal decelerations during pushing stage   Shoulder dystocia present?:  Neg     Delivery (Maternal) (Provider to Complete) ([id number])    Episiotomy:  None  Perineal lacerations:  3rd Repaired?:  Yes      Blood Loss  Mother: Dee Mendez R #[id number]   Start of Mother's Information    IO Blood Loss  20 0900 - 20 0807    None           End of Mother's Information  Mother: Dee Mendez R #[id number]         Delivery - Provider to Complete ([id number])    Delivering clinician:  Celestina Seay APRN CNM  CNM Care:  Exclusive CNM care in labor  Attempted Delivery Types (Choose all that apply):  Spontaneous Vaginal Delivery  Delivery Type (Choose the 1 that will go to the Birth History):  Vaginal, Spontaneous   Other personnel:   Provider Role   Martine Peterson RN          Placenta    Delayed Cord Clamping:  Done  Date/Time:  2020  7:38 AM  Removal:  Spontaneous  Comments:  intact with three vessel cord  Disposition:  Hospital disposal     Anesthesia    Method:  Epidural  Cervical dilation at placement:  0-3          Presentation and Position    Presentation:  Vertex  Position:  Left Occiput Anterior           DIANA Lopez CNM

## 2020-07-30 NOTE — PROGRESS NOTES
"S: Dee is doing well. Resting comfortably. On pitocin, good contractions. Some tachysystole and then some spaced. IV fluid bolus to help. Cervical check /-2 clear fluid. No other questions or concerns at this time.     O:  Blood pressure 130/73, pulse 99, temperature 99.6  F (37.6  C), temperature source Oral, resp. rate 16, height 1.6 m (5' 3\"), weight 99.8 kg (220 lb), last menstrual period 10/17/2019, SpO2 100 %, not currently breastfeeding.  General appearance: comfortable    CONTACTIONS: Contractions every 1-3 minutes.  Palpate: moderate  FETAL HEART TONES: baseline 130-135 with moderate FHR variability and    accelerations yes. Decelerations no.    NST: REACTIVE  ROM: clear fluid  PELVIC EXAM: /-2  Fetal Position: cephalic   Bloody show: Yes   Pitocin- 5 mu/min.,  Antibiotics- none  Cervical ripening: N/A    ASSESSMENT:  ==============  IUP @ 40w6d active labor   Fetal Heart rate tracing category one  GBS- negative     PLAN:  ===========  Comfort measures prn   Pain medication with epidural   Anticipate   Labor augmentation with Pitocin  Reevaluate in 2-4 hours/PRN     DIANA Lopez CNM    "

## 2020-07-30 NOTE — PROGRESS NOTES
"S: Getting uncomfortable with pressure. Pressed her epidural bolus but helped with only some of her discomfort. Requesting to be checked, cervix 8-9/90/-1 swollen anterior cervix. Making progress with station. Discussed that might be the different in her pain and recommended getting epidural bolus from anesthesia and sitting up. While sitting up she had a 2 minutes long deceleration down to the 70's, recovered when moved to her side. Anesthesia was able to come and give bolus. Not 100% relief but better. Able to tolerate okay. No other questions or concerns at this time.     O:  Blood pressure 135/75, pulse 99, temperature 99.8  F (37.7  C), temperature source Oral, resp. rate 18, height 1.6 m (5' 3\"), weight 99.8 kg (220 lb), last menstrual period 10/17/2019, SpO2 100 %, not currently breastfeeding.  General appearance: comfortable except some pressure with contractions     CONTACTIONS: Contractions every 1-4 minutes.  Palpate: moderate  FETAL HEART TONES: baseline 135 with moderate FHR variability and    accelerations yes. Decelerations yes, intermittent variable.    NST: REACTIVE  ROM: clear fluid  PELVIC EXAM: PELVIC EXAM: 9.5/ 90/ Mid/ soft/ -1   Fetal Position: cephalic   Bloody show: Yes   Pitocin- 5 mu/min.,  Antibiotics- none  Cervical ripening: N/A    ASSESSMENT:  ==============  IUP @ 41w0d active labor   Fetal Heart rate tracing category two  GBS- negative     PLAN:  ===========  Comfort measures prn   Pain medication with epidural   Anticipate   Labor augmentation with Pitocin per protocol   Reevaluate in 1 hour/PRN     DIANA Lopez CNM    "

## 2020-07-30 NOTE — PROVIDER NOTIFICATION
07/30/20 0451   Provider Notification   Provider Name/Title IFTIKHAR Seay CNM   Method of Notification At Bedside   Notification Reason Decels   Provider at bedside following notification for decelerations during pushing efforts. Changing maternal positions and IV fluid bolus started. Dr. Nair also at bedside to assess FHR tracing.

## 2020-07-31 VITALS
SYSTOLIC BLOOD PRESSURE: 119 MMHG | HEART RATE: 91 BPM | RESPIRATION RATE: 18 BRPM | BODY MASS INDEX: 38.98 KG/M2 | WEIGHT: 220 LBS | OXYGEN SATURATION: 98 % | TEMPERATURE: 98.6 F | HEIGHT: 63 IN | DIASTOLIC BLOOD PRESSURE: 76 MMHG

## 2020-07-31 LAB — HGB BLD-MCNC: 10.2 G/DL (ref 11.7–15.7)

## 2020-07-31 PROCEDURE — 36415 COLL VENOUS BLD VENIPUNCTURE: CPT | Performed by: ADVANCED PRACTICE MIDWIFE

## 2020-07-31 PROCEDURE — 25000132 ZZH RX MED GY IP 250 OP 250 PS 637: Performed by: ADVANCED PRACTICE MIDWIFE

## 2020-07-31 PROCEDURE — 85018 HEMOGLOBIN: CPT | Performed by: ADVANCED PRACTICE MIDWIFE

## 2020-07-31 RX ORDER — DOCUSATE SODIUM 100 MG/1
100 CAPSULE, LIQUID FILLED ORAL 2 TIMES DAILY
Qty: 60 CAPSULE | Refills: 1 | Status: SHIPPED | OUTPATIENT
Start: 2020-07-31 | End: 2022-10-14

## 2020-07-31 RX ADMIN — ACETAMINOPHEN 650 MG: 325 TABLET, FILM COATED ORAL at 04:45

## 2020-07-31 RX ADMIN — IBUPROFEN 800 MG: 800 TABLET, FILM COATED ORAL at 09:05

## 2020-07-31 RX ADMIN — POLYETHYLENE GLYCOL 3350 17 G: 17 POWDER, FOR SOLUTION ORAL at 09:05

## 2020-07-31 RX ADMIN — IBUPROFEN 800 MG: 800 TABLET, FILM COATED ORAL at 03:49

## 2020-07-31 RX ADMIN — ACETAMINOPHEN 650 MG: 325 TABLET, FILM COATED ORAL at 00:51

## 2020-07-31 RX ADMIN — DOCUSATE SODIUM 100 MG: 100 CAPSULE, LIQUID FILLED ORAL at 09:05

## 2020-07-31 NOTE — PLAN OF CARE
Data: Vital signs stable, postpartum assessments within normal limits.   Eating and drinking without nausea or vomiting.  Up ad stacy, and voiding without difficulty. Passing gas/BM.  Feeding baby independently-    Pain managed with _  . Pt states she is comfortable.  Perineum/Incision appears to be healing well, no s/s infection.  Discharge outcomes on care plan met.   Action: Review of care plan, teaching, and discharge instructions done. Infant identification with ID bands done, verification with signature obtained.   Response: Patient states understanding and comfort with her discharge instructions and plan of care. All questions addressed. She will discharge home with her infant.  07/31/2020@8001

## 2020-07-31 NOTE — LACTATION NOTE
This note was copied from a baby's chart.  Consult for: First baby, breastfeeding going better today with football hold but mom shares still has slight pinch feeling.    History:  Vaginal delivery @ 41w0d, AGA infant (94th percentile) @ 9# 3.8 ounces birthweight, 1.6% loss at 24 hours with low intermediate risk serum bilirubin. ENT consult for head molding, pinna right ear thinner with minimal curve. Maternal history of AMA @ 34 y/o, obesity, has been hand expressing able to get spoon full.     Breast exam of mom: Soft, rounded, symmetric with intact, everted nipples bilaterally. Dee noted early tenderness & leaking of colostrum flaking on breasts & she was able to express milk out last several weeks but did not notice breast growth during pregnancy (larger breasts, WNL).    Oral exam of baby: Recessed chin, normal arch, good lift and extension noted, organized suck on finger.     Feeding assessment:  Verbally described laid back and helped to set up her pillow support,  really no assist needed. Mom laid infant down prone while laid back and he latched readily with fairly good seal. Came off once, show how to tuck in more with finger and mom share zero pain, able to see and hear nutritive suck and swallows.     Education provided: Discussed positioning with good support, anatomy of breast and infant mouth, tips to get and maintain deeper latch, breast compressions to enhance milk transfer prn, point out nutritive vs. non-nutritive suck and how to hear swallows, benefits of skin to skin and feeding on cue, supply and demand, benefits of and how to do breast massage & hand expression. Reviewed baby's second night, how to tell when satiated and if getting enough, what to expect in the coming days and preventing engorgement, breastfeeding log with when and who to call if concerns, [facility] pump cleaning handout and breastfeeding resource list adding in additional online resources.    Plan: Please encourage frequent skin to  skin, breastfeed on cue with goal of 8 to 12 feedings in 24 hours & continued hand expressing after feedings until milk is in.  Follow up with outpatient lactation consultant as needed @ clinic for support with first time breastfeeding.

## 2020-07-31 NOTE — PLAN OF CARE
VSS and postpartum assessments WDL.  Up ad stacy with steady gait.  Independent with cares.  Bonding well with infant.  Breastfeeding on cue with minimal assistance.  Pain managed with tylenol and ibuprofen.   Ruben is present and supportive.  Will continue with postpartum cares and education per plan of care.

## 2020-07-31 NOTE — PLAN OF CARE
VSS. Fundus firm, midline at U/1. Lochia rubra and light. Voiding without difficulty, passing gas. Tolerating regular diet. Took shower tonight. IV removed. Perineum healing well - taking baths and using ice packs to help with pain. Reports generalized soreness from pushing - pain tolerable with tylenol and ibuprofen. Breastfeeding independently with direction from nurse on positioning and how to calm infant down to eat. Lactation consult released. Anticipate discharge 7/31. Continue current plan of care.

## 2020-07-31 NOTE — PROGRESS NOTES
"Post Partum Note    Dee Mendez      MRN#: [id number]  Age: 35 year old      YOB: 1985      Post-partum day #1    SIGNIFICANT PROBLEMS:  Patient Active Problem List    Diagnosis Date Noted      (normal spontaneous vaginal delivery) 2020     Priority: Medium     Encounter for triage in pregnant patient 2020     Priority: Medium     Labor and delivery indication for care or intervention 2020     Priority: Medium     AMA (advanced maternal age) multigravida 35+ 2020     Priority: Medium     FOB: Core[place]   Declined genetic testing   AMA   20 MFM U/S;  19 0/7 weeks, posterior placenta, male, ENRIKE: 20, MFP 3.3       Need for Tdap vaccination 12/10/2019     Priority: Medium     Tdap given 2020       Morbid obesity due to excess calories (H) 10/31/2016     Priority: Medium     CARDIOVASCULAR SCREENING; LDL GOAL LESS THAN 160 2010     Priority: Medium     Hypertrophic and atrophic condition of skin 2006     Priority: Medium     Problem list name updated by automated process. Provider to review       Allergic rhinitis 2006     Priority: Medium     Problem list name updated by automated process. Provider to review         INTERVAL HISTORY:  /77   Pulse 99   Temp 98.7  F (37.1  C) (Oral)   Resp 18   Ht 1.6 m (5' 3\")   Wt 99.8 kg (220 lb)   LMP 10/17/2019   SpO2 98%   Breastfeeding Unknown   BMI 38.97 kg/m      Pt stable, baby is rooming in  Breast feeding status:initiated  Complications since 2 hours post delivery: None  Patient is tolerating acitivity well Voiding without difficulty, cramping is minimal and is relieved by Ibuprophen, lochia is decreasing and patient denies clots.  Perineal pain is is relieved by Ibuprophen.  The perineum laceration is well approximated    Normal postpartum exam  3rd degree laceration repaired    Postpartum hemoglobin   Hemoglobin   Date Value Ref Range Status   2020 10.2 (L) 11.7 - 15.7 g/dL " Final     Blood type   Lab Results   Component Value Date    ABO A 07/29/2020       Lab Results   Component Value Date    RH Pos 07/29/2020     Rubella status No results found for:  Immune    ASSESSMENT/PLAN:  Stable Post-partum day #1  Complications: 3rd degree laceration repair  Plan d/c home today  RTC 6 weeks  Teaching done: D/C Instructions: Nutrition/Activity, Engorgement Management, Birth Control Options, Warning Signs/When to Call: Excessive Bleeding, Infection, PP Depression, Kegals and Crunches, RTC Clinic for PP Appointment and PNV    Postpartum warning s/s reviewed, including bleeding/clots, fever, mastitis, or depression    Birthcontrol planned:Lactation amenorrhea and Depo at 6 wks  Current Discharge Medication List      CONTINUE these medications which have NOT CHANGED    Details   omeprazole (PRILOSEC OTC) 20 MG EC tablet Take 1 tablet (20 mg) by mouth daily  Qty: 90 tablet, Refills: 0    Associated Diagnoses: Heartburn in pregnancy in third trimester      Prenatal Vit-Fe Fumarate-FA (PRENATAL MULTIVITAMIN W/IRON) 27-0.8 MG tablet Take 1 tablet by mouth daily      Probiotic Product (PROBIOTIC DAILY PO)       VENTOLIN  (90 BASE) MCG/ACT Inhaler INHALE 2 PUFFS PO Q 4 H PRN  Qty: 1 Inhaler, Refills: 3    Associated Diagnoses: Chronic obstructive airway disease with asthma (H)

## 2020-08-03 ENCOUNTER — OFFICE VISIT (OUTPATIENT)
Dept: MIDWIFE SERVICES | Facility: CLINIC | Age: 35
End: 2020-08-03
Payer: COMMERCIAL

## 2020-08-03 VITALS
DIASTOLIC BLOOD PRESSURE: 86 MMHG | BODY MASS INDEX: 37.38 KG/M2 | SYSTOLIC BLOOD PRESSURE: 135 MMHG | HEART RATE: 86 BPM | WEIGHT: 211 LBS | TEMPERATURE: 97.2 F

## 2020-08-03 PROCEDURE — 99024 POSTOP FOLLOW-UP VISIT: CPT | Performed by: ADVANCED PRACTICE MIDWIFE

## 2020-08-03 NOTE — NURSING NOTE
"Chief Complaint   Patient presents with     Patient Request     concerns about active bleeding       Initial /86 (BP Location: Left arm, Patient Position: Sitting, Cuff Size: Adult Regular)   Pulse 86   Temp 97.2  F (36.2  C) (Oral)   Wt 95.7 kg (211 lb)   LMP 10/17/2019   BMI 37.38 kg/m   Estimated body mass index is 37.38 kg/m  as calculated from the following:    Height as of 20: 1.6 m (5' 3\").    Weight as of this encounter: 95.7 kg (211 lb).  BP completed using cuff size: regular    Questioned patient about current smoking habits.  Pt. has never smoked.          The following HM Due: NONE      The following patient reported/Care Every where data was sent to:  P ABSTRACT QUALITY INITIATIVES [[id number]]  SHARON Bowman MA           "

## 2020-08-03 NOTE — PROGRESS NOTES
SUBJECTIVE:   Dee Mendez is here for a postpartum checkup laceration repair check at 4 days PP.     HPI: She is doing well. Their baby is doing well also. She is here today as she noted some increased perineum discomfort and thought maybe she had a pop and tore a stitch. She has been using witch hazel foam and baths and ice to help. The bath is stinging and when she uses the bathroom notes some stinging as well. She noted some bleeding after wiping also. She has been using a donut pillow but feels like that hurts a little also. We discussed comfort measures.   She is otherwise still doing well. Breastfeeding is going well. Milk came in today, breast are sore and needing to pump so baby can latch. Mood is good. She has good help and support from her .     DELIVERY DATE: 2020   of a viable boy, weight 9 pounds 3.8 oz., with no complications.  FEEDING METHOD:    EXAM:  /86 (BP Location: Left arm, Patient Position: Sitting, Cuff Size: Adult Regular)   Pulse 86   Temp 97.2  F (36.2  C) (Oral)   Wt 95.7 kg (211 lb)   LMP 10/17/2019   BMI 37.38 kg/m    PELVIC EXAM:  Vulva: BUS WNL, normal healing laceration, small open area where the soreness is but normal and healing well. Not bleeding. Most likely bleeding is coming from vagina and not laceration area.   Vagina: Discharge normal, no foul odor   Rectal exam: small external hemorrhoid     ASSESSMENT:   Normal healing laceration     PLAN:  Follow up for postpartum visit at 6 weeks. Does not desire IUD.     DIANA Lopez CNM

## 2020-09-11 ENCOUNTER — PRENATAL OFFICE VISIT (OUTPATIENT)
Dept: MIDWIFE SERVICES | Facility: CLINIC | Age: 35
End: 2020-09-11
Payer: COMMERCIAL

## 2020-09-11 VITALS
HEART RATE: 79 BPM | SYSTOLIC BLOOD PRESSURE: 131 MMHG | WEIGHT: 184 LBS | DIASTOLIC BLOOD PRESSURE: 79 MMHG | BODY MASS INDEX: 32.59 KG/M2 | TEMPERATURE: 98.4 F

## 2020-09-11 DIAGNOSIS — Z30.013 ENCOUNTER FOR INITIAL PRESCRIPTION OF INJECTABLE CONTRACEPTIVE: ICD-10-CM

## 2020-09-11 PROCEDURE — 99207 ZZC POST PARTUM EXAM: CPT | Performed by: ADVANCED PRACTICE MIDWIFE

## 2020-09-11 PROCEDURE — 96372 THER/PROPH/DIAG INJ SC/IM: CPT | Performed by: ADVANCED PRACTICE MIDWIFE

## 2020-09-11 RX ORDER — MEDROXYPROGESTERONE ACETATE 150 MG/ML
150 INJECTION, SUSPENSION INTRAMUSCULAR
Qty: 1 ML | Refills: 4 | OUTPATIENT
Start: 2020-09-11 | End: 2022-10-14

## 2020-09-11 RX ORDER — MEDROXYPROGESTERONE ACETATE 150 MG/ML
150 INJECTION, SUSPENSION INTRAMUSCULAR
Status: DISCONTINUED | OUTPATIENT
Start: 2020-09-11 | End: 2023-09-25

## 2020-09-11 RX ORDER — MEDROXYPROGESTERONE ACETATE 150 MG/ML
150 INJECTION, SUSPENSION INTRAMUSCULAR
Status: DISCONTINUED | OUTPATIENT
Start: 2020-09-11 | End: 2020-09-11 | Stop reason: CLARIF

## 2020-09-11 RX ADMIN — MEDROXYPROGESTERONE ACETATE 150 MG: 150 INJECTION, SUSPENSION INTRAMUSCULAR at 11:45

## 2020-09-11 NOTE — NURSING NOTE
"Chief Complaint   Patient presents with     Post Partum Exam       Initial /79 (BP Location: Left arm, Patient Position: Sitting, Cuff Size: Adult Regular)   Pulse 79   Temp 98.4  F (36.9  C) (Oral)   Wt 83.5 kg (184 lb)   LMP 10/17/2019   BMI 32.59 kg/m   Estimated body mass index is 32.59 kg/m  as calculated from the following:    Height as of 20: 1.6 m (5' 3\").    Weight as of this encounter: 83.5 kg (184 lb).  BP completed using cuff size: regular    Questioned patient about current smoking habits.  Pt. has never smoked.          The following HM Due: NONE      The following patient reported/Care Every where data was sent to:  P ABSTRACT QUALITY INITIATIVES [[id number]]  SHARON Bowman MA           "

## 2020-09-11 NOTE — PROGRESS NOTES
SUBJECTIVE: Dee is here for a 6-week postpartum checkup.    Delivery date was 20. She had a  of a viable boy,  with Induction, Gestational Diabetes - diet controlled and 3rd degree repair complications.  Since delivery, she has been breast feeding.  She has no signs of infection, bleeding or other complications.    We discussed contraception and she has chosen depoprovera.  She  has had intercourse since delivery and complains of mild discomfort. Patient screened for postpartum depression and complaints are negative for anxiety and depression.    [unfilled]  Screening has also been completed for intimate partner violence.    EXAM:  GENERAL healthy, alert and no distress  PSYCH: negative for anxiety and depression    ASSESSMENT:   Normal postpartum exam after .  Nl healing 3rd degree laceration with nl intercourse and BM    PLAN:  Return as needed or at time interval for next routine pap, pelvic, or breast exam.  Encourage Kegals and abdominal exercise.  Continue a multi vitamin supplement, especially if breastfeeding.  Pap smear was not obtained.  Post partum Hgb was not obtained.  Depo Provera started for birth control

## 2020-09-22 ENCOUNTER — MYC MEDICAL ADVICE (OUTPATIENT)
Dept: MIDWIFE SERVICES | Facility: CLINIC | Age: 35
End: 2020-09-22

## 2020-09-22 NOTE — TELEPHONE ENCOUNTER
Patient sent [employer] message. Was seen on 9/11 for 6 week PP visit. She says you spoke of returning bleeding. Patient started having 2 days of pink discharge and now into day 2 of light bleeding. She had stopped bleeding at 4 weeks PP, and is now 7.5 weeks PP. Any concerns? Patient wants to know how long this will last. Janee Roblero RN

## 2020-09-29 ENCOUNTER — HOSPITAL ENCOUNTER (OUTPATIENT)
Facility: CLINIC | Age: 35
Setting detail: OBSERVATION
Discharge: HOME | End: 2020-09-30
Attending: EMERGENCY MEDICINE | Admitting: SURGERY
Payer: COMMERCIAL

## 2020-09-29 ENCOUNTER — APPOINTMENT (OUTPATIENT)
Dept: ULTRASOUND IMAGING | Facility: CLINIC | Age: 35
End: 2020-09-29
Attending: EMERGENCY MEDICINE
Payer: COMMERCIAL

## 2020-09-29 DIAGNOSIS — K81.0 ACUTE CHOLECYSTITIS: ICD-10-CM

## 2020-09-29 PROBLEM — K81.9 CHOLECYSTITIS: Status: ACTIVE | Noted: 2020-09-29

## 2020-09-29 LAB
ALBUMIN SERPL-MCNC: 4.6 G/DL (ref 3.4–5)
ALBUMIN UR-MCNC: 10 MG/DL
ALP SERPL-CCNC: 278 U/L (ref 40–150)
ALT SERPL W P-5'-P-CCNC: 672 U/L (ref 0–50)
ANION GAP SERPL CALCULATED.3IONS-SCNC: 7 MMOL/L (ref 3–14)
APPEARANCE UR: CLEAR
AST SERPL W P-5'-P-CCNC: 807 U/L (ref 0–45)
BASOPHILS # BLD AUTO: 0 10E9/L (ref 0–0.2)
BASOPHILS NFR BLD AUTO: 0.2 %
BILIRUB SERPL-MCNC: 1.1 MG/DL (ref 0.2–1.3)
BILIRUB UR QL STRIP: NEGATIVE
BUN SERPL-MCNC: 23 MG/DL (ref 7–30)
CALCIUM SERPL-MCNC: 9.7 MG/DL (ref 8.5–10.1)
CHLORIDE SERPL-SCNC: 109 MMOL/L (ref 94–109)
CO2 SERPL-SCNC: 23 MMOL/L (ref 20–32)
COLOR UR AUTO: YELLOW
CREAT SERPL-MCNC: 0.64 MG/DL (ref 0.52–1.04)
DIFFERENTIAL METHOD BLD: NORMAL
EOSINOPHIL # BLD AUTO: 0 10E9/L (ref 0–0.7)
EOSINOPHIL NFR BLD AUTO: 0.4 %
ERYTHROCYTE [DISTWIDTH] IN BLOOD BY AUTOMATED COUNT: 13.5 % (ref 10–15)
GFR SERPL CREATININE-BSD FRML MDRD: >90 ML/MIN/{1.73_M2}
GLUCOSE SERPL-MCNC: 100 MG/DL (ref 70–99)
GLUCOSE UR STRIP-MCNC: NEGATIVE MG/DL
HCG SERPL QL: NEGATIVE
HCT VFR BLD AUTO: 43.5 % (ref 35–47)
HGB BLD-MCNC: 14.7 G/DL (ref 11.7–15.7)
HGB UR QL STRIP: NEGATIVE
IMM GRANULOCYTES # BLD: 0 10E9/L (ref 0–0.4)
IMM GRANULOCYTES NFR BLD: 0.1 %
KETONES UR STRIP-MCNC: 40 MG/DL
LABORATORY COMMENT REPORT: NORMAL
LEUKOCYTE ESTERASE UR QL STRIP: NEGATIVE
LIPASE SERPL-CCNC: 208 U/L (ref 73–393)
LYMPHOCYTES # BLD AUTO: 1.3 10E9/L (ref 0.8–5.3)
LYMPHOCYTES NFR BLD AUTO: 15.2 %
MCH RBC QN AUTO: 28.3 PG (ref 26.5–33)
MCHC RBC AUTO-ENTMCNC: 33.8 G/DL (ref 31.5–36.5)
MCV RBC AUTO: 84 FL (ref 78–100)
MONOCYTES # BLD AUTO: 0.4 10E9/L (ref 0–1.3)
MONOCYTES NFR BLD AUTO: 4.6 %
MUCOUS THREADS #/AREA URNS LPF: PRESENT /LPF
NEUTROPHILS # BLD AUTO: 6.6 10E9/L (ref 1.6–8.3)
NEUTROPHILS NFR BLD AUTO: 79.5 %
NITRATE UR QL: NEGATIVE
NRBC # BLD AUTO: 0 10*3/UL
NRBC BLD AUTO-RTO: 0 /100
PH UR STRIP: 7 PH (ref 5–7)
PLATELET # BLD AUTO: 285 10E9/L (ref 150–450)
POTASSIUM SERPL-SCNC: 3.5 MMOL/L (ref 3.4–5.3)
PROT SERPL-MCNC: 8.7 G/DL (ref 6.8–8.8)
RBC # BLD AUTO: 5.19 10E12/L (ref 3.8–5.2)
RBC #/AREA URNS AUTO: 1 /HPF (ref 0–2)
SARS-COV-2 RNA SPEC QL NAA+PROBE: NEGATIVE
SARS-COV-2 RNA SPEC QL NAA+PROBE: NORMAL
SODIUM SERPL-SCNC: 139 MMOL/L (ref 133–144)
SOURCE: ABNORMAL
SP GR UR STRIP: 1.01 (ref 1–1.03)
SPECIMEN SOURCE: NORMAL
SPECIMEN SOURCE: NORMAL
SQUAMOUS #/AREA URNS AUTO: <1 /HPF (ref 0–1)
UROBILINOGEN UR STRIP-MCNC: 2 MG/DL (ref 0–2)
WBC # BLD AUTO: 8.3 10E9/L (ref 4–11)
WBC #/AREA URNS AUTO: 1 /HPF (ref 0–5)

## 2020-09-29 PROCEDURE — 25000128 H RX IP 250 OP 636: Performed by: EMERGENCY MEDICINE

## 2020-09-29 PROCEDURE — 96366 THER/PROPH/DIAG IV INF ADDON: CPT

## 2020-09-29 PROCEDURE — 96375 TX/PRO/DX INJ NEW DRUG ADDON: CPT

## 2020-09-29 PROCEDURE — 25000132 ZZH RX MED GY IP 250 OP 250 PS 637: Performed by: SURGERY

## 2020-09-29 PROCEDURE — 99285 EMERGENCY DEPT VISIT HI MDM: CPT | Mod: 25

## 2020-09-29 PROCEDURE — G0378 HOSPITAL OBSERVATION PER HR: HCPCS

## 2020-09-29 PROCEDURE — 96365 THER/PROPH/DIAG IV INF INIT: CPT

## 2020-09-29 PROCEDURE — 85025 COMPLETE CBC W/AUTO DIFF WBC: CPT | Performed by: EMERGENCY MEDICINE

## 2020-09-29 PROCEDURE — 76705 ECHO EXAM OF ABDOMEN: CPT

## 2020-09-29 PROCEDURE — 96361 HYDRATE IV INFUSION ADD-ON: CPT

## 2020-09-29 PROCEDURE — 25000128 H RX IP 250 OP 636: Performed by: SURGERY

## 2020-09-29 PROCEDURE — C9803 HOPD COVID-19 SPEC COLLECT: HCPCS

## 2020-09-29 PROCEDURE — 80053 COMPREHEN METABOLIC PANEL: CPT | Performed by: EMERGENCY MEDICINE

## 2020-09-29 PROCEDURE — 84703 CHORIONIC GONADOTROPIN ASSAY: CPT | Performed by: EMERGENCY MEDICINE

## 2020-09-29 PROCEDURE — 83690 ASSAY OF LIPASE: CPT | Performed by: EMERGENCY MEDICINE

## 2020-09-29 PROCEDURE — 96376 TX/PRO/DX INJ SAME DRUG ADON: CPT

## 2020-09-29 PROCEDURE — 25800030 ZZH RX IP 258 OP 636: Performed by: EMERGENCY MEDICINE

## 2020-09-29 PROCEDURE — U0003 INFECTIOUS AGENT DETECTION BY NUCLEIC ACID (DNA OR RNA); SEVERE ACUTE RESPIRATORY SYNDROME CORONAVIRUS 2 (SARS-COV-2) (CORONAVIRUS DISEASE [COVID-19]), AMPLIFIED PROBE TECHNIQUE, MAKING USE OF HIGH THROUGHPUT TECHNOLOGIES AS DESCRIBED BY CMS-2020-01-R: HCPCS | Performed by: EMERGENCY MEDICINE

## 2020-09-29 PROCEDURE — 81001 URINALYSIS AUTO W/SCOPE: CPT | Performed by: EMERGENCY MEDICINE

## 2020-09-29 PROCEDURE — 99204 OFFICE O/P NEW MOD 45 MIN: CPT | Mod: 57 | Performed by: SURGERY

## 2020-09-29 RX ORDER — AMOXICILLIN 250 MG
2 CAPSULE ORAL 2 TIMES DAILY
Status: DISCONTINUED | OUTPATIENT
Start: 2020-09-29 | End: 2020-09-30 | Stop reason: HOSPADM

## 2020-09-29 RX ORDER — NALOXONE HYDROCHLORIDE 0.4 MG/ML
.1-.4 INJECTION, SOLUTION INTRAMUSCULAR; INTRAVENOUS; SUBCUTANEOUS
Status: DISCONTINUED | OUTPATIENT
Start: 2020-09-29 | End: 2020-09-30 | Stop reason: HOSPADM

## 2020-09-29 RX ORDER — OXYCODONE HCL 10 MG/1
10 TABLET, FILM COATED, EXTENDED RELEASE ORAL EVERY 12 HOURS
Status: DISCONTINUED | OUTPATIENT
Start: 2020-09-29 | End: 2020-09-29

## 2020-09-29 RX ORDER — ACETAMINOPHEN 650 MG/1
650 SUPPOSITORY RECTAL EVERY 4 HOURS PRN
Status: DISCONTINUED | OUTPATIENT
Start: 2020-09-29 | End: 2020-09-30 | Stop reason: HOSPADM

## 2020-09-29 RX ORDER — PIPERACILLIN SODIUM, TAZOBACTAM SODIUM 3; .375 G/15ML; G/15ML
3.38 INJECTION, POWDER, LYOPHILIZED, FOR SOLUTION INTRAVENOUS ONCE
Status: COMPLETED | OUTPATIENT
Start: 2020-09-29 | End: 2020-09-29

## 2020-09-29 RX ORDER — KETOROLAC TROMETHAMINE 15 MG/ML
15 INJECTION, SOLUTION INTRAMUSCULAR; INTRAVENOUS ONCE
Status: COMPLETED | OUTPATIENT
Start: 2020-09-29 | End: 2020-09-29

## 2020-09-29 RX ORDER — AMOXICILLIN 250 MG
1 CAPSULE ORAL 2 TIMES DAILY
Status: DISCONTINUED | OUTPATIENT
Start: 2020-09-29 | End: 2020-09-30 | Stop reason: HOSPADM

## 2020-09-29 RX ORDER — ONDANSETRON 4 MG/1
4 TABLET, ORALLY DISINTEGRATING ORAL EVERY 6 HOURS PRN
Status: DISCONTINUED | OUTPATIENT
Start: 2020-09-29 | End: 2020-09-30 | Stop reason: HOSPADM

## 2020-09-29 RX ORDER — HYDROMORPHONE HYDROCHLORIDE 1 MG/ML
0.5 INJECTION, SOLUTION INTRAMUSCULAR; INTRAVENOUS; SUBCUTANEOUS
Status: DISCONTINUED | OUTPATIENT
Start: 2020-09-29 | End: 2020-09-29

## 2020-09-29 RX ORDER — ONDANSETRON 2 MG/ML
4 INJECTION INTRAMUSCULAR; INTRAVENOUS ONCE
Status: COMPLETED | OUTPATIENT
Start: 2020-09-29 | End: 2020-09-29

## 2020-09-29 RX ORDER — OXYCODONE AND ACETAMINOPHEN 5; 325 MG/1; MG/1
1 TABLET ORAL EVERY 4 HOURS PRN
Status: DISCONTINUED | OUTPATIENT
Start: 2020-09-29 | End: 2020-09-30 | Stop reason: HOSPADM

## 2020-09-29 RX ORDER — HYDROMORPHONE HYDROCHLORIDE 1 MG/ML
0.3 INJECTION, SOLUTION INTRAMUSCULAR; INTRAVENOUS; SUBCUTANEOUS
Status: DISCONTINUED | OUTPATIENT
Start: 2020-09-29 | End: 2020-09-30 | Stop reason: HOSPADM

## 2020-09-29 RX ORDER — ONDANSETRON 2 MG/ML
4 INJECTION INTRAMUSCULAR; INTRAVENOUS EVERY 6 HOURS PRN
Status: DISCONTINUED | OUTPATIENT
Start: 2020-09-29 | End: 2020-09-30 | Stop reason: HOSPADM

## 2020-09-29 RX ORDER — PIPERACILLIN SODIUM, TAZOBACTAM SODIUM 3; .375 G/15ML; G/15ML
3.38 INJECTION, POWDER, LYOPHILIZED, FOR SOLUTION INTRAVENOUS EVERY 6 HOURS
Status: DISCONTINUED | OUTPATIENT
Start: 2020-09-29 | End: 2020-09-30 | Stop reason: HOSPADM

## 2020-09-29 RX ADMIN — KETOROLAC TROMETHAMINE 15 MG: 15 INJECTION, SOLUTION INTRAMUSCULAR; INTRAVENOUS at 08:44

## 2020-09-29 RX ADMIN — HYDROMORPHONE HYDROCHLORIDE 0.5 MG: 1 INJECTION, SOLUTION INTRAMUSCULAR; INTRAVENOUS; SUBCUTANEOUS at 08:43

## 2020-09-29 RX ADMIN — SODIUM CHLORIDE 1000 ML: 9 INJECTION, SOLUTION INTRAVENOUS at 10:55

## 2020-09-29 RX ADMIN — DOCUSATE SODIUM 50 MG AND SENNOSIDES 8.6 MG 2 TABLET: 8.6; 5 TABLET, FILM COATED ORAL at 21:52

## 2020-09-29 RX ADMIN — PIPERACILLIN SODIUM AND TAZOBACTAM SODIUM 3.38 G: 3; .375 INJECTION, POWDER, LYOPHILIZED, FOR SOLUTION INTRAVENOUS at 10:55

## 2020-09-29 RX ADMIN — PIPERACILLIN SODIUM AND TAZOBACTAM SODIUM 3.38 G: 3; .375 INJECTION, POWDER, LYOPHILIZED, FOR SOLUTION INTRAVENOUS at 17:15

## 2020-09-29 RX ADMIN — ONDANSETRON 4 MG: 2 INJECTION INTRAMUSCULAR; INTRAVENOUS at 08:43

## 2020-09-29 RX ADMIN — SODIUM CHLORIDE 1000 ML: 9 INJECTION, SOLUTION INTRAVENOUS at 08:42

## 2020-09-29 RX ADMIN — PIPERACILLIN SODIUM AND TAZOBACTAM SODIUM 3.38 G: 3; .375 INJECTION, POWDER, LYOPHILIZED, FOR SOLUTION INTRAVENOUS at 21:51

## 2020-09-29 ASSESSMENT — ENCOUNTER SYMPTOMS
MYALGIAS: 0
CHILLS: 0
FEVER: 0
HEMATURIA: 0
VOMITING: 1
BLOOD IN STOOL: 0
ABDOMINAL PAIN: 1
COUGH: 0
BACK PAIN: 1
DIARRHEA: 0

## 2020-09-29 ASSESSMENT — MIFFLIN-ST. JEOR: SCORE: 1484.68

## 2020-09-29 NOTE — ED NOTES
DATE:  9/29/2020   TIME OF RECEIPT FROM LAB:  0930  LAB TEST:  AST = 807  ALT = 672  RESULTS GIVEN WITH READ-BACK TO (PROVIDER):  Lani  TIME LAB VALUE REPORTED TO PROVIDER:   0970

## 2020-09-29 NOTE — H&P
[facility]  General Surgery Consultation         Andrew Hale MD    Dee Mendez MRN# [id number]   YOB: 1985 Age: 35 year old      Date of Admission:  9/29/2020  Date of Consult: 9/29/2020         Assessment and Plan:   Patient is a 35 year old female with acute cholecystitis with possible choledocholithiasis    PLAN:  I discussed the pathophysiology of gallbladder disease and complications of cholecystitis and choledocholithiasis with the patient.  She has probable cholecystitis with increased LFTs.  Bile duct within normal limits but suspicious for possible choledocholithiasis or passed stone.  Her COVID test is pending.  I would recommend admission to the hospital with antibiotics.  Will await COVID test and repeat LFTs in a.m.  If same or decreased we will plan to go forward with laparoscopic cholecystectomy with intraoperative cholangiogram.  If LFTs greatly increase will consult GI.  Patient can have a regular diet with n.p.o. after midnight.  I also discussed the risks associated with the procedure including, but not limited to infection, bleeding, conversion to open, bile leak, bile duct injury, retained gallstones, pneumonia, MI, and anesthesia complications with the patient.  I also discussed if a complication did occur it may require further surgical intervention during or after the procedure. The patient indicated understanding of the discussion, asked appropriate questions, and provided consent        Chief Complaint:     Chief Complaint   Patient presents with     Back Pain          History of Present Illness:   Patient is a 35 year old female who I was asked to see by Dr. Garibay  for evaluation of cholecystitis.The patient describes having symptoms for the last 2 days.  She has had similar attacks since her pregnancy in July.  This is the worst out of all of them.  The pain is mainly located in the RUQ area. The patient rates the pain a 6 out of 10. The pain  "is exacerbated by oral intake. The pain is relieved by rest.  Last bowel movement was 5 days ago. The patient does have nausea and vomiting. Patient denies fevers, chills, nausea, vomiting, jaundice, chest pain.          Physical Exam:   Blood pressure 109/74, pulse 60, temperature 98.3  F (36.8  C), temperature source Oral, resp. rate 20, height 1.6 m (5' 3\"), weight 82.6 kg (182 lb), last menstrual period 10/17/2019, SpO2 99 %, unknown if currently breastfeeding.  182 lbs 0 oz  General: Generally appears well.  Psych: Alert and Oriented.  Normal affect  Neurological: grossly intact  Eyes: Sclera clear  Respiratory:  Lungs with good air excursion  Cardiovascular:  Normal peripheral pulses  GI: Abdomen Soft Mild tenderness to palpation RUQ No hernias palpated..  Lymphatic/Hematologic/Immune:  No obvious lymphadenopathy.  Integumentary:  No rashes       Past Medical History:     Past Medical History:   Diagnosis Date     ASCUS favor benign 3/2006    neg HPV     Morbid obesity due to excess calories (H) 10/31/2016     Nexplanon insertion 9/8/2014    Implanon removed and replaced with Nexplanon nexplanon lot# [id number]  exp:  10/16      Streptococcal sore throat     recurrent pharyngitis     UTI 9/19/2003          Past Surgical History:     Past Surgical History:   Procedure Laterality Date     implanon  4/9/12          Current Medications:           medroxyPROGESTERone  150 mg Intramuscular Q90 Days     HYDROmorphone       Home Medications:     Prior to Admission medications    Medication Sig Last Dose Taking? Auth Provider   docusate sodium (COLACE) 100 MG capsule Take 1 capsule (100 mg) by mouth 2 times daily  Patient taking differently: Take 100 mg by mouth daily  9/29/2020 at Unknown time Yes Jesus Arenas APRN CNM   medroxyPROGESTERone (DEPO-PROVERA) 150 MG/ML IM injection Inject 1 mL (150 mg) into the muscle every 3 months 9/11/2020 Yes Jesus Arenas APRN CNM   Prenatal Vit-Fe Fumarate-FA (PRENATAL " MULTIVITAMIN W/IRON) 27-0.8 MG tablet Take 1 tablet by mouth daily 9/29/2020 at Unknown time Yes Reported, Patient   Probiotic Product (PROBIOTIC DAILY PO) Take 1 tablet by mouth daily  9/29/2020 at Unknown time Yes Reported, Patient          Allergies:     Allergies   Allergen Reactions     Adhesive Tape      Bandaids irritate skin     Amoxicillin      vaginal irritation after 3-4 days     Azithromycin      zithromax--migraines and stomach cramps     Corn-Related Products      Sulfamethoxazole W/Trimethoprim      GI     Food Nausea     Food intolerance test taken corn ,soy , potato, bananas, carrot, lettuce, bakers yeast, brewers yeast,   Also lactose intolerant, checked on  On 4/16/15  Also eggs dont make her feel well but not officially checked      Liquid Adhesive Rash          Family History:     Family History   Problem Relation Age of Onset     Asthma Mother      Heart Failure Mother      Hypertension Mother      Chronic Obstructive Pulmonary Disease Mother      Diabetes Mother      Hypertension Father      Lipids Father      Gastrointestinal Disease Father      Lung Cancer Father      Cancer Maternal Grandmother         d:  age 53; lung with mets; smoker     Neurologic Disorder Maternal Aunt         b:  1967 ; MS     Seizure Disorder Brother         adolescents     Colon Cancer Paternal Grandmother          Social History:   Dee Mendez  reports that she has never smoked. She has never used smokeless tobacco. She reports previous alcohol use. She reports that she does not use drugs.        Review of Systems:   The 12 point Review of Systems is negative other than noted in the HPI.       Labs/Imaging   All new lab and imaging data was reviewed.   Andrew Hale M.D.  [facility]

## 2020-09-29 NOTE — ED PROVIDER NOTES
History     Chief Complaint:  Back Pain       HPI   Dee Mendez is a 35 year old female, 8 weeks post vaginal delivery, who presents with back pain. The patient says that she had her first episode of back pain 1 week after her vaginal delivery. She says that she had back spasms that were severe enough to cause her to vomit, but the pain was gone by the next day. Today in the ED, she says that the pain has been present for the past 2 weeks, with worsening over the past 2 days. The patient says that the back pain has started to wrap around to her abdomen. She says that the pain is mostly at night when she tries to lay down. The patient endorses vomiting due to the pain. She denies any fever, chills, diarrhea, cough, vaginal discharge, pain in her arms or legs, or blood in her vomit, stool, or urine.     Allergies:  Adhesive Tape  Amoxicillin  Azithromycin  Corn-Related Products  Sulfamethoxazole W/Trimethoprim  Food  Liquid Adhesive     Medications:    Colace  Depo-provera     Past Medical History:    Obesity  Nexplanon insertion   UTI     Past Surgical History:    Nexplanon insertion      Family History:    Colon caner  Seizure disorder  MS  Lung cancer  GI disease  Lipid  Hypertension   COPD  Diabetes  Asthma  Heart failure      Social History:  The patient was accompanied to the ED by .  Smoking Status: Never Smoker  Smokeless Tobacco: Never Used  Alcohol Use: Not currently   Drug Use: No   PCP: Jacy Cooley    Review of Systems   Constitutional: Negative for chills and fever.   Respiratory: Negative for cough.    Gastrointestinal: Positive for abdominal pain and vomiting (no blood). Negative for blood in stool and diarrhea.   Genitourinary: Negative for hematuria and vaginal discharge.   Musculoskeletal: Positive for back pain. Negative for myalgias.   All other systems reviewed and are negative.    Physical Exam     Patient Vitals for the past 24 hrs:   BP Temp Temp src Pulse SpO2 Height Weight  "  09/29/20 0900 135/81 -- -- 71 100 % -- --   09/29/20 0821 (!) 144/119 98.3  F (36.8  C) Oral 118 98 % 1.6 m (5' 3\") 82.6 kg (182 lb)        Physical Exam  Nursing note and vitals reviewed.  Constitutional:  Appears well-developed and well-nourished. Appears uncomfortable  HENT:   Head:    Atraumatic.   Mouth/Throat:   Oropharynx is clear and moist. No oropharyngeal exudate.   Eyes:    Pupils are equal, round, and reactive to light.   Neck:    Normal range of motion. Neck supple.      No tracheal deviation present. No thyromegaly present.   Cardiovascular:  Normal rate, regular rhythm, no murmur   Pulmonary/Chest: Breath sounds are clear and equal without wheezes or crackles.  Abdominal:   Soft. Bowel sounds are normal. Exhibits no distension and      no mass. There is RUQ tenderness with mild guarding, but no rebound.      There is no rebound and no guarding.   Musculoskeletal:  Exhibits no edema.   Lymphadenopathy:  No cervical adenopathy.   Neurological:   Alert and oriented to person, place, and time.   Skin:    Skin is warm and dry. No rash noted. No pallor.      Emergency Department Course     Imaging:  Radiology findings were communicated with the patient who voiced understanding of the findings.    Abdomen US, limited (RUQ only)  1.  Cholelithiasis and gallbladder distention. If there is continued  clinical suspicion for cholecystitis, consider nuclear medicine  hepatobiliary scan for further evaluation.  2.  Otherwise unremarkable right upper quadrant ultrasound.  Reading per radiology     Laboratory:  Laboratory findings were communicated with the patient who voiced understanding of the findings.    UA with micro: ketone 40 (A), protein albumin 10 (A), mucous present (A) o/w negative    HCG qualitative pregnancy: negative  CBC: WBC 8.3, HGB 14.7,   CMP:  (H), alkphos 278 (H),  (HH),  (HH) o/w WNL (Creatinine 0.64)  Lipase: 208    Asymptomatic COVID-19 Virus (Coronavirus) by " PCR: pending     Procedures    Interventions:  0842 NS 1 L IV   Zofran 4 mg IV   Dilaudid 05 mg IV  0844 Toradol 15 mg IV   1055 Zosyn 3.375 g IV    NS 1 L IV     Emergency Department Course:    0823 Nursing notes and vitals reviewed.    0827 I performed an exam of the patient as documented above.     0840 IV was inserted and blood was drawn for laboratory testing, results above.     0914 The patient was sent for a US while in the emergency department, results above.      0932 The patient provided a urine sample here in the emergency department. This was sent for laboratory testing, findings above.     1025 Patient rechecked and updated.      1030 I spoke with Dr. Vega of the general surgery service regarding patient's presentation, findings, and plan of care.     1043 Patient rechecked and updated.       Findings and plan explained to the Patient who consents to admission. Discussed the patient with Dr. Vega, who will admit the patient to a bed for further monitoring, evaluation, and treatment.     Impression & Plan      Medical Decision Making:  Dee Mendez is a 35 year old female who presents to the emergency department today for evaluation of  abdominal pain consistent with gallstones and biliary colic.  Ultrasound has confirmed the presence of gallstones.  There is no clinical, laboratory, or ultrasound evidence of Choledocholithiasis, Gallstone Pancreatitis, or Ascending Cholangitis. Labs were drawn and her ALT and AST returned as critical values. I spoke with general surgery and the patient was admitted to the hospital.        Covid-19  Dee Mendez was evaluated during a global COVID-19 pandemic, which necessitated consideration that the patient might be at risk for infection with the SARS-CoV-2 virus that causes COVID-19.   Applicable protocols for evaluation were followed during the patient's care.   COVID-19 was considered as part of the patient's evaluation. The plan for testing is:  a test was  obtained during this visit.       Diagnosis:    ICD-10-CM    1. Acute cholecystitis  K81.0         Disposition:   The patient is admitted into the care of Dr. Hael pending surgery.     Scribe Disclosure:  I, Bethel Cruz, am serving as a scribe at 8:26 AM on 9/29/2020 to document services personally performed by Britany Gariaby MD based on my observations and the provider's statements to me.        Britany Garibay MD  09/29/20 1625       Britany Garibay MD  09/29/20 1628

## 2020-09-29 NOTE — ED TRIAGE NOTES
Pt has had back pain x2 days . Did not sleep last night. Tearful in triage. Had a baby 2 months ago.

## 2020-09-29 NOTE — PROVIDER NOTIFICATION
MD Notification    Notified Person: MD surgery    Notified Person Name: Dr. Curiel    Notification Date/Time: 09/29/20, 5:00 PM    Notification Interaction: Telephone with Readback    Purpose of Notification: Clarifying whether MD wanted oxycontin, since pt is opioid niaive.    Orders Received: Will change order    Comments:

## 2020-09-29 NOTE — PLAN OF CARE
Pt A/Ox4. Vitally stable.  Up ind in room.  Pumping and dump her milk.  Denies pain and nausea at this time.  Plan for NPO at midnight for anders pearce tomorrow.

## 2020-09-29 NOTE — PHARMACY-ADMISSION MEDICATION HISTORY
Pharmacy Medication History  Admission medication history interview status for the 9/29/2020  admission is complete. See EPIC admission navigator for prior to admission medications     Medication history sources: Patient  Medication history source reliability: Good  Adherence assessment: Good    Significant changes made to the medication list:  None      Additional medication history information:   None    Medication reconciliation completed by provider prior to medication history? No    Time spent in this activity: 10 min      Prior to Admission medications    Medication Sig Last Dose Taking? Auth Provider   docusate sodium (COLACE) 100 MG capsule Take 1 capsule (100 mg) by mouth 2 times daily  Patient taking differently: Take 100 mg by mouth daily  9/29/2020 at Unknown time Yes Jesus Arenas APRN CNM   medroxyPROGESTERone (DEPO-PROVERA) 150 MG/ML IM injection Inject 1 mL (150 mg) into the muscle every 3 months 9/11/2020 Yes Jesus Arenas APRN CNM   Prenatal Vit-Fe Fumarate-FA (PRENATAL MULTIVITAMIN W/IRON) 27-0.8 MG tablet Take 1 tablet by mouth daily 9/29/2020 at Unknown time Yes Reported, Patient   Probiotic Product (PROBIOTIC DAILY PO) Take 1 tablet by mouth daily  9/29/2020 at Unknown time Yes Reported, Patient

## 2020-09-29 NOTE — ED NOTES
"[facility]  ED Nurse Handoff Report    ED Chief complaint: Back Pain      ED Diagnosis:   Final diagnoses:   None       Code Status: Not addressed in ED    Allergies:   Allergies   Allergen Reactions     Adhesive Tape      Bandaids irritate skin     Amoxicillin      vaginal irritation after 3-4 days     Azithromycin      zithromax--migraines and stomach cramps     Corn-Related Products      Sulfamethoxazole W/Trimethoprim      GI     Food Nausea     Food intolerance test taken corn ,soy , potato, bananas, carrot, lettuce, bakers yeast, brewers yeast,   Also lactose intolerant, checked on  On 4/16/15  Also eggs dont make her feel well but not officially checked      Liquid Adhesive Rash       Patient Story: Severe right upper back pain radiating into the RUQ abd starting two days ago, worse last night. Pt is two months post partum  Focused Assessment:  In distress with pain on arrival to ER, critical LFTs, Cholelithiasis and gallbladder distention on US    Treatments and/or interventions provided: Dilaudid, toradol, 2L NS bolus, will start zosyn in ED  Patient's response to treatments and/or interventions: Pain improved significantly    To be done/followed up on inpatient unit:  none currently    Does this patient have any cognitive concerns?: none    Activity level - Baseline/Home:  Independent  Activity Level - Current:   Independent    Patient's Preferred language: English   Needed?: No    Isolation: None  Infection: Not Applicable  Bariatric?: No    Vital Signs:   Vitals:    09/29/20 0821 09/29/20 0900   BP: (!) 144/119 135/81   Pulse: 118 71   Temp: 98.3  F (36.8  C)    TempSrc: Oral    SpO2: 98% 100%   Weight: 82.6 kg (182 lb)    Height: 1.6 m (5' 3\")        Cardiac Rhythm:     Was the PSS-3 completed:   Yes  What interventions are required if any?               Family Comments: Sig other at bedside  OBS brochure/video discussed/provided to patient/family: N/A              Name of " person given brochure if not patient: na              Relationship to patient: na    For the majority of the shift this patient's behavior was Green.   Behavioral interventions performed were none.    ED NURSE PHONE NUMBER: [phone]

## 2020-09-30 ENCOUNTER — APPOINTMENT (OUTPATIENT)
Dept: GENERAL RADIOLOGY | Facility: CLINIC | Age: 35
End: 2020-09-30
Attending: SURGERY
Payer: COMMERCIAL

## 2020-09-30 ENCOUNTER — ANESTHESIA EVENT (OUTPATIENT)
Dept: SURGERY | Facility: CLINIC | Age: 35
End: 2020-09-30
Payer: COMMERCIAL

## 2020-09-30 ENCOUNTER — ANESTHESIA (OUTPATIENT)
Dept: SURGERY | Facility: CLINIC | Age: 35
End: 2020-09-30
Payer: COMMERCIAL

## 2020-09-30 ENCOUNTER — APPOINTMENT (OUTPATIENT)
Dept: SURGERY | Facility: PHYSICIAN GROUP | Age: 35
End: 2020-09-30
Payer: COMMERCIAL

## 2020-09-30 VITALS
BODY MASS INDEX: 32.25 KG/M2 | HEIGHT: 63 IN | TEMPERATURE: 96.6 F | DIASTOLIC BLOOD PRESSURE: 81 MMHG | HEART RATE: 89 BPM | RESPIRATION RATE: 12 BRPM | SYSTOLIC BLOOD PRESSURE: 138 MMHG | OXYGEN SATURATION: 93 % | WEIGHT: 182 LBS

## 2020-09-30 LAB
ALBUMIN SERPL-MCNC: 3.2 G/DL (ref 3.4–5)
ALP SERPL-CCNC: 170 U/L (ref 40–150)
ALT SERPL W P-5'-P-CCNC: 433 U/L (ref 0–50)
ANION GAP SERPL CALCULATED.3IONS-SCNC: 3 MMOL/L (ref 3–14)
AST SERPL W P-5'-P-CCNC: 151 U/L (ref 0–45)
BILIRUB SERPL-MCNC: 0.6 MG/DL (ref 0.2–1.3)
BUN SERPL-MCNC: 10 MG/DL (ref 7–30)
CALCIUM SERPL-MCNC: 8.1 MG/DL (ref 8.5–10.1)
CHLORIDE SERPL-SCNC: 114 MMOL/L (ref 94–109)
CO2 SERPL-SCNC: 26 MMOL/L (ref 20–32)
CREAT SERPL-MCNC: 0.75 MG/DL (ref 0.52–1.04)
ERYTHROCYTE [DISTWIDTH] IN BLOOD BY AUTOMATED COUNT: 13.9 % (ref 10–15)
GFR SERPL CREATININE-BSD FRML MDRD: >90 ML/MIN/{1.73_M2}
GLUCOSE SERPL-MCNC: 80 MG/DL (ref 70–99)
HCG UR QL: NEGATIVE
HCT VFR BLD AUTO: 36.8 % (ref 35–47)
HGB BLD-MCNC: 12.2 G/DL (ref 11.7–15.7)
MCH RBC QN AUTO: 28.3 PG (ref 26.5–33)
MCHC RBC AUTO-ENTMCNC: 33.2 G/DL (ref 31.5–36.5)
MCV RBC AUTO: 85 FL (ref 78–100)
PLATELET # BLD AUTO: 228 10E9/L (ref 150–450)
POTASSIUM SERPL-SCNC: 3.6 MMOL/L (ref 3.4–5.3)
PROT SERPL-MCNC: 6.3 G/DL (ref 6.8–8.8)
RBC # BLD AUTO: 4.31 10E12/L (ref 3.8–5.2)
SODIUM SERPL-SCNC: 143 MMOL/L (ref 133–144)
WBC # BLD AUTO: 5 10E9/L (ref 4–11)

## 2020-09-30 PROCEDURE — 88304 TISSUE EXAM BY PATHOLOGIST: CPT | Mod: 26 | Performed by: SURGERY

## 2020-09-30 PROCEDURE — 25800025 ZZH RX 258: Performed by: SURGERY

## 2020-09-30 PROCEDURE — 40000170 ZZH STATISTIC PRE-PROCEDURE ASSESSMENT II: Performed by: SURGERY

## 2020-09-30 PROCEDURE — 36000063 ZZH SURGERY LEVEL 4 EA 15 ADDTL MIN: Performed by: SURGERY

## 2020-09-30 PROCEDURE — 25000125 ZZHC RX 250: Performed by: NURSE ANESTHETIST, CERTIFIED REGISTERED

## 2020-09-30 PROCEDURE — 71000012 ZZH RECOVERY PHASE 1 LEVEL 1 FIRST HR: Performed by: SURGERY

## 2020-09-30 PROCEDURE — 96376 TX/PRO/DX INJ SAME DRUG ADON: CPT | Mod: 59

## 2020-09-30 PROCEDURE — 25000566 ZZH SEVOFLURANE, EA 15 MIN: Performed by: SURGERY

## 2020-09-30 PROCEDURE — 37000009 ZZH ANESTHESIA TECHNICAL FEE, EACH ADDTL 15 MIN: Performed by: SURGERY

## 2020-09-30 PROCEDURE — G0378 HOSPITAL OBSERVATION PER HR: HCPCS

## 2020-09-30 PROCEDURE — 80053 COMPREHEN METABOLIC PANEL: CPT | Performed by: SURGERY

## 2020-09-30 PROCEDURE — 25000128 H RX IP 250 OP 636: Performed by: NURSE ANESTHETIST, CERTIFIED REGISTERED

## 2020-09-30 PROCEDURE — 27210794 ZZH OR GENERAL SUPPLY STERILE: Performed by: SURGERY

## 2020-09-30 PROCEDURE — 85027 COMPLETE CBC AUTOMATED: CPT | Performed by: SURGERY

## 2020-09-30 PROCEDURE — 25000128 H RX IP 250 OP 636: Performed by: ANESTHESIOLOGY

## 2020-09-30 PROCEDURE — 81025 URINE PREGNANCY TEST: CPT | Performed by: ANESTHESIOLOGY

## 2020-09-30 PROCEDURE — 88304 TISSUE EXAM BY PATHOLOGIST: CPT | Performed by: SURGERY

## 2020-09-30 PROCEDURE — 25000128 H RX IP 250 OP 636: Performed by: SURGERY

## 2020-09-30 PROCEDURE — 36000065 ZZH SURGERY LEVEL 4 W FLUORO 1ST 30 MIN: Performed by: SURGERY

## 2020-09-30 PROCEDURE — 36415 COLL VENOUS BLD VENIPUNCTURE: CPT | Performed by: SURGERY

## 2020-09-30 PROCEDURE — 40000277 XR SURGERY CARM FLUORO LESS THAN 5 MIN W STILLS

## 2020-09-30 PROCEDURE — 25800030 ZZH RX IP 258 OP 636: Performed by: NURSE ANESTHETIST, CERTIFIED REGISTERED

## 2020-09-30 PROCEDURE — 25000132 ZZH RX MED GY IP 250 OP 250 PS 637: Performed by: SURGERY

## 2020-09-30 PROCEDURE — 25000125 ZZHC RX 250: Performed by: SURGERY

## 2020-09-30 PROCEDURE — 37000008 ZZH ANESTHESIA TECHNICAL FEE, 1ST 30 MIN: Performed by: SURGERY

## 2020-09-30 RX ORDER — SODIUM CHLORIDE, SODIUM LACTATE, POTASSIUM CHLORIDE, CALCIUM CHLORIDE 600; 310; 30; 20 MG/100ML; MG/100ML; MG/100ML; MG/100ML
INJECTION, SOLUTION INTRAVENOUS CONTINUOUS
Status: DISCONTINUED | OUTPATIENT
Start: 2020-09-30 | End: 2020-09-30 | Stop reason: HOSPADM

## 2020-09-30 RX ORDER — DEXAMETHASONE SODIUM PHOSPHATE 4 MG/ML
INJECTION, SOLUTION INTRA-ARTICULAR; INTRALESIONAL; INTRAMUSCULAR; INTRAVENOUS; SOFT TISSUE PRN
Status: DISCONTINUED | OUTPATIENT
Start: 2020-09-30 | End: 2020-09-30

## 2020-09-30 RX ORDER — NALOXONE HYDROCHLORIDE 0.4 MG/ML
.1-.4 INJECTION, SOLUTION INTRAMUSCULAR; INTRAVENOUS; SUBCUTANEOUS
Status: DISCONTINUED | OUTPATIENT
Start: 2020-09-30 | End: 2020-09-30 | Stop reason: HOSPADM

## 2020-09-30 RX ORDER — LABETALOL HYDROCHLORIDE 5 MG/ML
10 INJECTION, SOLUTION INTRAVENOUS
Status: DISCONTINUED | OUTPATIENT
Start: 2020-09-30 | End: 2020-09-30 | Stop reason: HOSPADM

## 2020-09-30 RX ORDER — FENTANYL CITRATE 50 UG/ML
INJECTION, SOLUTION INTRAMUSCULAR; INTRAVENOUS PRN
Status: DISCONTINUED | OUTPATIENT
Start: 2020-09-30 | End: 2020-09-30

## 2020-09-30 RX ORDER — GLYCOPYRROLATE 0.2 MG/ML
INJECTION, SOLUTION INTRAMUSCULAR; INTRAVENOUS PRN
Status: DISCONTINUED | OUTPATIENT
Start: 2020-09-30 | End: 2020-09-30

## 2020-09-30 RX ORDER — ONDANSETRON 2 MG/ML
4 INJECTION INTRAMUSCULAR; INTRAVENOUS EVERY 30 MIN PRN
Status: DISCONTINUED | OUTPATIENT
Start: 2020-09-30 | End: 2020-09-30 | Stop reason: HOSPADM

## 2020-09-30 RX ORDER — SODIUM CHLORIDE, SODIUM LACTATE, POTASSIUM CHLORIDE, CALCIUM CHLORIDE 600; 310; 30; 20 MG/100ML; MG/100ML; MG/100ML; MG/100ML
INJECTION, SOLUTION INTRAVENOUS CONTINUOUS PRN
Status: DISCONTINUED | OUTPATIENT
Start: 2020-09-30 | End: 2020-09-30

## 2020-09-30 RX ORDER — KETOROLAC TROMETHAMINE 30 MG/ML
30 INJECTION, SOLUTION INTRAMUSCULAR; INTRAVENOUS ONCE
Status: COMPLETED | OUTPATIENT
Start: 2020-09-30 | End: 2020-09-30

## 2020-09-30 RX ORDER — ONDANSETRON 2 MG/ML
INJECTION INTRAMUSCULAR; INTRAVENOUS PRN
Status: DISCONTINUED | OUTPATIENT
Start: 2020-09-30 | End: 2020-09-30

## 2020-09-30 RX ORDER — BUPIVACAINE HYDROCHLORIDE AND EPINEPHRINE 2.5; 5 MG/ML; UG/ML
INJECTION, SOLUTION INFILTRATION; PERINEURAL PRN
Status: DISCONTINUED | OUTPATIENT
Start: 2020-09-30 | End: 2020-09-30 | Stop reason: HOSPADM

## 2020-09-30 RX ORDER — MEPERIDINE HYDROCHLORIDE 25 MG/ML
12.5 INJECTION INTRAMUSCULAR; INTRAVENOUS; SUBCUTANEOUS
Status: DISCONTINUED | OUTPATIENT
Start: 2020-09-30 | End: 2020-09-30 | Stop reason: HOSPADM

## 2020-09-30 RX ORDER — PROPOFOL 10 MG/ML
INJECTION, EMULSION INTRAVENOUS PRN
Status: DISCONTINUED | OUTPATIENT
Start: 2020-09-30 | End: 2020-09-30

## 2020-09-30 RX ORDER — HYDROMORPHONE HYDROCHLORIDE 1 MG/ML
.3-.5 INJECTION, SOLUTION INTRAMUSCULAR; INTRAVENOUS; SUBCUTANEOUS EVERY 5 MIN PRN
Status: DISCONTINUED | OUTPATIENT
Start: 2020-09-30 | End: 2020-09-30 | Stop reason: HOSPADM

## 2020-09-30 RX ORDER — MEPERIDINE HYDROCHLORIDE 25 MG/ML
12.5 INJECTION INTRAMUSCULAR; INTRAVENOUS; SUBCUTANEOUS EVERY 5 MIN PRN
Status: DISCONTINUED | OUTPATIENT
Start: 2020-09-30 | End: 2020-09-30 | Stop reason: HOSPADM

## 2020-09-30 RX ORDER — OXYCODONE HYDROCHLORIDE 5 MG/1
5 TABLET ORAL EVERY 6 HOURS PRN
Qty: 10 TABLET | Refills: 0 | Status: SHIPPED | OUTPATIENT
Start: 2020-09-30 | End: 2020-10-03

## 2020-09-30 RX ORDER — NEOSTIGMINE METHYLSULFATE 1 MG/ML
VIAL (ML) INJECTION PRN
Status: DISCONTINUED | OUTPATIENT
Start: 2020-09-30 | End: 2020-09-30

## 2020-09-30 RX ORDER — ONDANSETRON 4 MG/1
4 TABLET, ORALLY DISINTEGRATING ORAL EVERY 30 MIN PRN
Status: DISCONTINUED | OUTPATIENT
Start: 2020-09-30 | End: 2020-09-30 | Stop reason: HOSPADM

## 2020-09-30 RX ORDER — HYDRALAZINE HYDROCHLORIDE 20 MG/ML
2.5-5 INJECTION INTRAMUSCULAR; INTRAVENOUS EVERY 10 MIN PRN
Status: DISCONTINUED | OUTPATIENT
Start: 2020-09-30 | End: 2020-09-30 | Stop reason: HOSPADM

## 2020-09-30 RX ORDER — MAGNESIUM HYDROXIDE 1200 MG/15ML
LIQUID ORAL PRN
Status: DISCONTINUED | OUTPATIENT
Start: 2020-09-30 | End: 2020-09-30 | Stop reason: HOSPADM

## 2020-09-30 RX ORDER — PROPOFOL 10 MG/ML
INJECTION, EMULSION INTRAVENOUS CONTINUOUS PRN
Status: DISCONTINUED | OUTPATIENT
Start: 2020-09-30 | End: 2020-09-30

## 2020-09-30 RX ORDER — FENTANYL CITRATE 50 UG/ML
25-50 INJECTION, SOLUTION INTRAMUSCULAR; INTRAVENOUS
Status: DISCONTINUED | OUTPATIENT
Start: 2020-09-30 | End: 2020-09-30 | Stop reason: HOSPADM

## 2020-09-30 RX ORDER — LIDOCAINE HYDROCHLORIDE 20 MG/ML
INJECTION, SOLUTION INFILTRATION; PERINEURAL PRN
Status: DISCONTINUED | OUTPATIENT
Start: 2020-09-30 | End: 2020-09-30

## 2020-09-30 RX ORDER — ALBUTEROL SULFATE 0.83 MG/ML
2.5 SOLUTION RESPIRATORY (INHALATION) EVERY 4 HOURS PRN
Status: DISCONTINUED | OUTPATIENT
Start: 2020-09-30 | End: 2020-09-30 | Stop reason: HOSPADM

## 2020-09-30 RX ADMIN — HYDROMORPHONE HYDROCHLORIDE 0.5 MG: 1 INJECTION, SOLUTION INTRAMUSCULAR; INTRAVENOUS; SUBCUTANEOUS at 17:23

## 2020-09-30 RX ADMIN — OXYCODONE HYDROCHLORIDE AND ACETAMINOPHEN 1 TABLET: 5; 325 TABLET ORAL at 19:48

## 2020-09-30 RX ADMIN — PROPOFOL 260 MG: 10 INJECTION, EMULSION INTRAVENOUS at 14:23

## 2020-09-30 RX ADMIN — FENTANYL CITRATE 50 MCG: 50 INJECTION, SOLUTION INTRAMUSCULAR; INTRAVENOUS at 15:03

## 2020-09-30 RX ADMIN — KETOROLAC TROMETHAMINE 30 MG: 30 INJECTION, SOLUTION INTRAMUSCULAR at 16:28

## 2020-09-30 RX ADMIN — PIPERACILLIN SODIUM AND TAZOBACTAM SODIUM 3.38 G: 3; .375 INJECTION, POWDER, LYOPHILIZED, FOR SOLUTION INTRAVENOUS at 09:51

## 2020-09-30 RX ADMIN — PIPERACILLIN SODIUM AND TAZOBACTAM SODIUM 3.38 G: 3; .375 INJECTION, POWDER, LYOPHILIZED, FOR SOLUTION INTRAVENOUS at 03:34

## 2020-09-30 RX ADMIN — HYDROMORPHONE HYDROCHLORIDE 0.5 MG: 1 INJECTION, SOLUTION INTRAMUSCULAR; INTRAVENOUS; SUBCUTANEOUS at 16:05

## 2020-09-30 RX ADMIN — DEXAMETHASONE SODIUM PHOSPHATE 4 MG: 4 INJECTION, SOLUTION INTRA-ARTICULAR; INTRALESIONAL; INTRAMUSCULAR; INTRAVENOUS; SOFT TISSUE at 14:56

## 2020-09-30 RX ADMIN — FENTANYL CITRATE 50 MCG: 50 INJECTION, SOLUTION INTRAMUSCULAR; INTRAVENOUS at 15:55

## 2020-09-30 RX ADMIN — ONDANSETRON 4 MG: 2 INJECTION INTRAMUSCULAR; INTRAVENOUS at 15:36

## 2020-09-30 RX ADMIN — SODIUM CHLORIDE, POTASSIUM CHLORIDE, SODIUM LACTATE AND CALCIUM CHLORIDE: 600; 310; 30; 20 INJECTION, SOLUTION INTRAVENOUS at 14:15

## 2020-09-30 RX ADMIN — FENTANYL CITRATE 50 MCG: 50 INJECTION, SOLUTION INTRAMUSCULAR; INTRAVENOUS at 14:50

## 2020-09-30 RX ADMIN — NEOSTIGMINE METHYLSULFATE 5 MG: 1 INJECTION, SOLUTION INTRAVENOUS at 15:34

## 2020-09-30 RX ADMIN — PROPOFOL 30 MCG/KG/MIN: 10 INJECTION, EMULSION INTRAVENOUS at 14:27

## 2020-09-30 RX ADMIN — HYDROMORPHONE HYDROCHLORIDE 0.5 MG: 1 INJECTION, SOLUTION INTRAMUSCULAR; INTRAVENOUS; SUBCUTANEOUS at 16:19

## 2020-09-30 RX ADMIN — HYDROMORPHONE HYDROCHLORIDE 0.5 MG: 1 INJECTION, SOLUTION INTRAMUSCULAR; INTRAVENOUS; SUBCUTANEOUS at 14:52

## 2020-09-30 RX ADMIN — ROCURONIUM BROMIDE 10 MG: 10 INJECTION INTRAVENOUS at 14:49

## 2020-09-30 RX ADMIN — MIDAZOLAM 2 MG: 1 INJECTION INTRAMUSCULAR; INTRAVENOUS at 14:17

## 2020-09-30 RX ADMIN — GLYCOPYRROLATE 0.8 MG: 0.2 INJECTION, SOLUTION INTRAMUSCULAR; INTRAVENOUS at 15:34

## 2020-09-30 RX ADMIN — PROPOFOL 30 MG: 10 INJECTION, EMULSION INTRAVENOUS at 14:26

## 2020-09-30 RX ADMIN — FENTANYL CITRATE 50 MCG: 50 INJECTION, SOLUTION INTRAMUSCULAR; INTRAVENOUS at 14:23

## 2020-09-30 RX ADMIN — ROCURONIUM BROMIDE 40 MG: 10 INJECTION INTRAVENOUS at 14:23

## 2020-09-30 RX ADMIN — LIDOCAINE HYDROCHLORIDE 40 MG: 20 INJECTION, SOLUTION INFILTRATION; PERINEURAL at 14:23

## 2020-09-30 NOTE — ADDENDUM NOTE
Addendum  created 09/30/20 1710 by Purvi Win MD    Attestation recorded in Intraprocedure, Intraprocedure Attestations filed

## 2020-09-30 NOTE — OR NURSING
Patient to PACU constantly moaning. CRNA gave Fentanyl on arrival in PACU. Patient has also had Dilaudid 0.5 mg since arrival, but continues to moan. Ice applied to abdomen. Dr. Mahmood to bedside to assess. Will give Demerol.

## 2020-09-30 NOTE — BRIEF OP NOTE
[facility]    Brief Operative Note    Pre-operative diagnosis: Back pain [M54.9]  Post-operative diagnosis acute cholecystitis    Procedure: Procedure(s):  CHOLECYSTECTOMY, LAPAROSCOPIC, WITH CHOLANGIOGRAM  Surgeon: Surgeon(s) and Role:     * Andrew Hale MD - Primary     * Rc Turner MD - Resident - Assisting  Anesthesia: General   Estimated blood loss: Less than 10 ml  Drains: None  Specimens:   ID Type Source Tests Collected by Time Destination   A : Gallbladder and Contents Tissue Gallbladder and Contents SURGICAL PATHOLOGY EXAM Andrew Hale MD 9/30/2020  3:23 PM      Findings:   mildly inflammed gallbladder, intra-op cholangiogram negative for stones in CBD.  Complications: None.  Implants: * No implants in log *      Recs:   -plan to discharge home today

## 2020-09-30 NOTE — OP NOTE
General Surgery Operative Note    PREOPERATIVE DIAGNOSIS:  Cholecystitis.    POSTOPERATIVE DIAGNOSIS:  Cholecystitis.    PROCEDURE:  Laparoscopic Cholecystectomy with cholangiogram    SURGEON:  Andrew Hale M.D.    ASSISTANT:  Rc Turner M.D.    ANESTHESIA:  General.    BLOOD LOSS: 10 cc    FINDINGS: Cholecystitis. No filling defects noted on cholangiogram    INDICATIONS:   Mrs. Mendez presented with cholecystitis and is now presenting for laparoscopic cholecystectomy. I explained the risks, benefits, complications including but not limited to bleeding, infection, possible need to open, possible postop hematoma, seroma, bowel, bladder or bile duct injury, MI, PE, and if any of these occurred the patient would require additional procedures. The patient agreed and did sign consent.    DETAILS OF PROCEDURE: The patient was brought to the operating room per Anesthesia, placed in supine position, and intubated without difficulty. A Surgical timeout was then performed, verifying the correct surgeon, site, procedure, and patient and all in the room were in agreement. 1% lidocaine and 0.25% were injected into infraumbilical area. A skin incision was made and was carried down to the anterior fascia. The fascia was grasped with 2 Kocher's and sharply incised. An open Boaz technique was used to get intra-abdominal cavity. The camera was inserted and revealed no trocar injuries. Local was injected to the upper abdomen and 3  5's were placed in the subxiphoid and right upper quadrant under direct visualization. The gallbladder was retracted superiorly and laterally. The gallbladder was moderaley inflamed. Cautery was used to take down the peritoneal attachments. I was able to delineate the artery and duct and got under the undersurface of the gallbladder to help declinate the duct and artery further. This was taken up high to confirm the critical view on multiple views. Once I was comfortable that there was 1 duct and 1  artery going straight into the gallbladder, I clipped the distal cystic duct and made a small ducttomy was made.  Cholangiocatheter was easily inserted into the cystic duct.  A cholangiogram was performed showing prompt filling in the right and left hepatic and spillage into the duodenum.  No significant distention was noted.  Cholangiocatheter was removed.  The duct was then clipped 2 times proximally and 1 distally. This was cut with scissors. The artery was done in a similar manner. The gallbladder was taken off the liver bed with cautery. There was no bile spillage or significant bleeding. The gallbladder was then placed in an Endo catch bag and removed through the umbilical trocar site. The camera was reinserted which showed no bleeding or bile spillage. Copious irrigation was used until clear. The wound bed was inspected multiple times showing that it was completely dry and that the clips were in place. The omentum was packed into the perihepatic fossa. All gas was expelled and the trocars were taken out under direct visualization. The fascia closed with 0 Vicryl in figure-of-eight fashion. Marcaine 0.25% were injected to all the wounds. The skin was closed with a 4-0 Monocryl in a subcuticular fashion. Steri-strips and sterile dressings were applied. The patient tolerated the procedure well. There were no complications. They were awoken from anesthesia and transferred to PACU in stable condition. All sponge, instrument and needle counts were correct. The physician assistant was medically necessary to assist in prepping, positioning, camera operation, retraction/exposure, and closure of the port sites.     RENETTA ACOSTA MD     Please route or send letter to:  Primary Care Provider (PCP) and Referring Provider

## 2020-09-30 NOTE — DISCHARGE INSTRUCTIONS
Same Day Surgery Discharge Instructions for  Sedation and General Anesthesia       It's not unusual to feel dizzy, light-headed or faint for up to 24 hours after surgery or while taking pain medication.  If you have these symptoms: sit for a few minutes before standing and have someone assist you when you get up to walk or use the bathroom.      You should rest and relax for the next 24 hours. We recommend you make arrangements to have an adult stay with you for at least 24 hours after your discharge.  Avoid hazardous and strenuous activity.      DO NOT DRIVE any vehicle or operate mechanical equipment for 24 hours following the end of your surgery.  Even though you may feel normal, your reactions may be affected by the medication you have received.      Do not drink alcoholic beverages for 24 hours following surgery.       Slowly progress to your regular diet as you feel able. It's not unusual to feel nauseated and/or vomit after receiving anesthesia.  If you develop these symptoms, drink clear liquids (apple juice, ginger ale, broth, 7-up, etc. ) until you feel better.  If your nausea and vomiting persists for 24 hours, please notify your surgeon.        All narcotic pain medications, along with inactivity and anesthesia, can cause constipation. Drinking plenty of liquids and increasing fiber intake will help.      For any questions of a medical nature, call your surgeon.      Do not make important decisions for 24 hours.      If you had general anesthesia, you may have a sore throat for a couple of days related to the breathing tube used during surgery.  You may use Cepacol lozenges to help with this discomfort.  If it worsens or if you develop a fever, contact your surgeon.       If you feel your pain is not well managed with the pain medications prescribed by your surgeon, please contact your surgeon's office to let them know so they can address your concerns.       CoVid 19 Information    We want to give you  information regarding Covid. Please consult your primary care provider with any questions you might have.     Patient who have symptoms (cough, fever, or shortness of breath), need to isolate for 7 days from when symptoms started OR 72 hours after fever resolves (without fever reducing medications) AND improvement of respiratory symptoms (whichever is longer).      Isolate yourself at home (in own room/own bathroom if possible)    Do Not allow any visitors    Do Not go to work or school    Do Not go to [employer],  centers, shopping, or other public places.    Do Not shake hands.    Avoid close and intimate contact with others (hugging, kissing).    Follow CDC recommendations for household cleaning of frequently touched services.     After the initial 7 days, continue to isolate yourself from household members as much as possible. To continue decrease the risk of community spread and exposure, you and any members of your household should limit activities in public for 14 days after starting home isolation.     You can reference the following CDC link for helpful home isolation/care tips:  https://www.cdc.gov/coronavirus/2019-ncov/downloads/10Things.pdf    Protect Others:    Cover Your Mouth and Nose with a mask, disposable tissue or wash cloth to avoid spreading germs to others.    Wash your hands and face frequently with soap and water    Call Your Primary Doctor If: Breathing difficulty develops or you become worse.    For more information about COVID19 and options for caring for yourself at home, please visit the CDC website at https://www.cdc.gov/coronavirus/2019-ncov/about/steps-when-sick.html  For more options for care at [facility], please visit our website at [email or web address]      Today you received Toradol, an antiinflammatory medication similar to Ibuprofen.  You should not take other antiinflammatory medication, such as Ibuprofen, Motrin, Advil, Aleve,  Naprosyn, etc until 10:30 pm.       **If you have questions or concerns about your procedure,   call Dr. Hale at [phone]**

## 2020-09-30 NOTE — ANESTHESIA CARE TRANSFER NOTE
Patient: Dee Mendez    Procedure(s):  CHOLECYSTECTOMY, LAPAROSCOPIC, WITH CHOLANGIOGRAM    Diagnosis: Back pain [M54.9]  Diagnosis Additional Information: No value filed.    Anesthesia Type:   General     Note:  Airway :Face Mask  Patient transferred to:PACU  Comments: Suctioned, spont resp ,lifts head  > 5 sec. Extubated with immediate exchange, to PACU, report to RN.Handoff Report: Identifed the Patient, Identified the Reponsible Provider, Reviewed the pertinent medical history, Discussed the surgical course, Reviewed Intra-OP anesthesia mangement and issues during anesthesia, Set expectations for post-procedure period and Allowed opportunity for questions and acknowledgement of understanding      Vitals: (Last set prior to Anesthesia Care Transfer)    CRNA VITALS  9/30/2020 1519 - 9/30/2020 1555      9/30/2020             Pulse:  119    SpO2:  91 %    Resp Rate (observed):  8                Electronically Signed By: DIANA Cobian CRNA  September 30, 2020  3:55 PM

## 2020-09-30 NOTE — PLAN OF CARE
Patient is A&Ox4. VSS. Denies pain and nausea. Up independently in room. R PIV SL with intermittent antibiotics. NPO for lap portia 9/30 at 1430. Breast pumping in room, keeping milk chilled on ice. Calls appropriately.

## 2020-09-30 NOTE — PROGRESS NOTES
Surgery    Doing well. No pain. LFT's improved.     Abd- Soft. Mild tenderness in RUQ    A/P  LFT's improved.  Will plan for lap portia and IOC this afternoon. Patient in agreement.    Andrew Hale M.D.  [facility]  [phone]

## 2020-09-30 NOTE — ANESTHESIA PREPROCEDURE EVALUATION
Anesthesia Pre-Procedure Evaluation    Patient: Dee Mendez   MRN: [id number] : 1985          Preoperative Diagnosis: Back pain [M54.9]    Procedure(s):  CHOLECYSTECTOMY, LAPAROSCOPIC, WITH CHOLANGIOGRAM    Past Medical History:   Diagnosis Date     ASCUS favor benign 3/2006    neg HPV     Morbid obesity due to excess calories (H) 10/31/2016     Nexplanon insertion 2014    Implanon removed and replaced with Nexplanon nexplanon lot# [id number]  exp:  10/16      Streptococcal sore throat     recurrent pharyngitis     UTI 2003     Past Surgical History:   Procedure Laterality Date     implanon  12       Anesthesia Evaluation     . Pt has had prior anesthetic.     No history of anesthetic complications          ROS/MED HX    ENT/Pulmonary:      (-) sleep apnea   Neurologic:       Cardiovascular:         METS/Exercise Tolerance:     Hematologic:         Musculoskeletal:         GI/Hepatic:     (+) cholecystitis/cholelithiasis,      (-) GERD   Renal/Genitourinary:         Endo:     (+) Obesity, .      Psychiatric:         Infectious Disease:         Malignancy:         Other:                                 Lab Results   Component Value Date    WBC 5.0 2020    HGB 12.2 2020    HCT 36.8 2020     2020     2020    POTASSIUM 3.6 2020    CHLORIDE 114 (H) 2020    CO2 26 2020    BUN 10 2020    CR 0.75 2020    GLC 80 2020    MIKAEL 8.1 (L) 2020    ALBUMIN 3.2 (L) 2020    PROTTOTAL 6.3 (L) 2020     (H) 2020     (H) 2020    ALKPHOS 170 (H) 2020    BILITOTAL 0.6 2020    LIPASE 208 2020    TSH 0.84 10/25/2016    HCG Negative 2020    HCGS Negative 2020       Preop Vitals  BP Readings from Last 3 Encounters:   20 129/88   20 131/79   20 135/86    Pulse Readings from Last 3 Encounters:   20 88   20 79   20 86      Resp Readings  "from Last 3 Encounters:   09/30/20 17   07/31/20 18   05/13/11 14    SpO2 Readings from Last 3 Encounters:   09/30/20 98%   07/30/20 98%   07/24/20 99%      Temp Readings from Last 1 Encounters:   09/30/20 36.3  C (97.4  F) (Temporal)    Ht Readings from Last 1 Encounters:   09/29/20 1.6 m (5' 3\")      Wt Readings from Last 1 Encounters:   09/29/20 82.6 kg (182 lb)    Estimated body mass index is 32.24 kg/m  as calculated from the following:    Height as of this encounter: 1.6 m (5' 3\").    Weight as of this encounter: 82.6 kg (182 lb).       Anesthesia Plan      History & Physical Review  History and physical reviewed and following examination; no interval change.    ASA Status:  2 .    NPO Status:  > 8 hours    Plan for General with Intravenous induction. Maintenance will be Balanced.    PONV prophylaxis:  Ondansetron (or other 5HT-3) and Dexamethasone or Solumedrol         Postoperative Care  Postoperative pain management:  IV analgesics and Oral pain medications.      Consents  Anesthetic plan, risks, benefits and alternatives discussed with:  Patient..                 Yahaira Mahmood MD, MD  "

## 2020-09-30 NOTE — ANESTHESIA POSTPROCEDURE EVALUATION
Patient: Dee Mendez    Procedure(s):  CHOLECYSTECTOMY, LAPAROSCOPIC, WITH CHOLANGIOGRAM    Diagnosis:Back pain [M54.9]  Diagnosis Additional Information: No value filed.    Anesthesia Type:  General    Note:  Anesthesia Post Evaluation    Patient location during evaluation: PACU  Patient participation: Able to fully participate in evaluation  Level of consciousness: awake  Pain management: adequate  Airway patency: patent  Cardiovascular status: acceptable  Respiratory status: acceptable  Hydration status: acceptable  PONV: none     Anesthetic complications: None          Last vitals:  Vitals:    09/30/20 1216 09/30/20 1553 09/30/20 1600   BP: 129/88 (!) 146/79 (!) 140/83   Pulse: 88 87 80   Resp: 17 19 14   Temp: 36.3  C (97.4  F) 35.8  C (96.4  F)    SpO2: 98% 100% 100%         Electronically Signed By: Yahaira Mahmood MD, MD  September 30, 2020  4:10 PM

## 2020-10-01 NOTE — PLAN OF CARE
Pt up from PACU POD #0 lap portia.  Slightly groggy.  Tolerating clear liquids, denies pain and nausea.  Lap sites intact.  Plan to discharge home tonight after tolerating food.

## 2020-10-01 NOTE — PROGRESS NOTES
Patient discharged at 8:29 PM to home. IV was discontinued. Pain at time of discharge was 5 of 10, Percocet given prior to discharge, decreased when leaving the floor. Belongings returned to patient.  Discharge instructions and medications reviewed with patient.  Patient verbalized understanding and all questions were answered. Prescriptions sent to [facility] in [place]. At time of discharge, patient condition was stable and left the unit by wheelchair escorted by CNA and patient .

## 2020-10-01 NOTE — DISCHARGE SUMMARY
Admission Date: 2020  Dismissal Date: 2020    Diagnoses:  Patient Active Problem List   Diagnosis     Hypertrophic and atrophic condition of skin     Allergic rhinitis     CARDIOVASCULAR SCREENING; LDL GOAL LESS THAN 160     Morbid obesity due to excess calories (H)     Need for Tdap vaccination     AMA (advanced maternal age) multigravida 35+     Encounter for triage in pregnant patient     Labor and delivery indication for care or intervention      (normal spontaneous vaginal delivery)     Third degree laceration of perineum, type 3c     Cholecystitis       Consultants:  None  Procedures Performed:  Laparoscopic cholecystectomy and cholangiogram    Brief Clinical History: Please see the H&P    Hospital Course:  Patient was admitted for cholecystitis and possible choledocholithiasis.  Her LFTs improved in the morning.  She underwent an uneventful laparoscopic cholecystectomy with cholangiogram.  She did very well after surgery.  Had a non-complicated postoperative course. No significant complications occurred and the patient was ready for dismissal to home after meeting all discharge criteria.      Instructions:  Diet: Return to preoperative diet as tolerated.  Activity: Slowly return to regular activity as tolerated.  Medications: Resume home medications   Followup: 2 weeks in surgical office    Questions answered.    Condition on discharge: Stable

## 2020-10-02 ENCOUNTER — MEDICAL CORRESPONDENCE (OUTPATIENT)
Dept: HEALTH INFORMATION MANAGEMENT | Facility: CLINIC | Age: 35
End: 2020-10-02

## 2020-10-02 LAB — COPATH REPORT: NORMAL

## 2020-10-22 ENCOUNTER — TELEPHONE (OUTPATIENT)
Dept: SURGERY | Facility: CLINIC | Age: 35
End: 2020-10-22

## 2020-10-22 NOTE — TELEPHONE ENCOUNTER
[facility]  Post op call note - Laparoscopic Cholecystectomy    Dee Mendez was called for an update regarding her recovery.  She underwent a laparoscopic cholecystectomy by Dr. Hale on 9/30/2020.  Today she tells me she is doing well and denies any complaints.  She currently does not need any pain medications.  She is eating a normal diet and her bowels are regular.   The patient states she is slowly resuming normal activity.  She states her wounds are healing well and the steri strips are off.  She denies any erythema or drainage at her wounds.  The patient denies fever/chills, n/v/d, abdominal pain, changes in urination or BM, or wound concerns.     The pathology revealed chronic cholecystitis.  This was discussed with the patient.  She was advised to advance her activity as tolerated with no heavy lifting > 20 lbs or strenuous exercise x 1-2 weeks.  The patient states all of her questions were answered and she understands our discussion.  She agrees to follow up as needed and to call our office with any concerns.    Rc Turner MD  General Surgery Resident, PGY-4   P: [phone]

## 2020-12-08 ENCOUNTER — OFFICE VISIT (OUTPATIENT)
Dept: MIDWIFE SERVICES | Facility: CLINIC | Age: 35
End: 2020-12-08
Payer: COMMERCIAL

## 2020-12-08 VITALS
TEMPERATURE: 98.5 F | BODY MASS INDEX: 31.18 KG/M2 | HEART RATE: 87 BPM | WEIGHT: 176 LBS | DIASTOLIC BLOOD PRESSURE: 72 MMHG | SYSTOLIC BLOOD PRESSURE: 116 MMHG

## 2020-12-08 DIAGNOSIS — Z01.419 ENCOUNTER FOR GYNECOLOGICAL EXAMINATION WITHOUT ABNORMAL FINDING: Primary | ICD-10-CM

## 2020-12-08 DIAGNOSIS — L30.9 ECZEMA, UNSPECIFIED TYPE: ICD-10-CM

## 2020-12-08 PROBLEM — Z36.89 ENCOUNTER FOR TRIAGE IN PREGNANT PATIENT: Status: RESOLVED | Noted: 2020-07-29 | Resolved: 2020-12-08

## 2020-12-08 PROBLEM — O09.529 AMA (ADVANCED MATERNAL AGE) MULTIGRAVIDA 35+: Status: RESOLVED | Noted: 2020-02-27 | Resolved: 2020-12-08

## 2020-12-08 PROBLEM — Z23 NEED FOR TDAP VACCINATION: Status: RESOLVED | Noted: 2019-12-10 | Resolved: 2020-12-08

## 2020-12-08 PROCEDURE — 80053 COMPREHEN METABOLIC PANEL: CPT | Performed by: ADVANCED PRACTICE MIDWIFE

## 2020-12-08 PROCEDURE — 36415 COLL VENOUS BLD VENIPUNCTURE: CPT | Performed by: ADVANCED PRACTICE MIDWIFE

## 2020-12-08 PROCEDURE — 99395 PREV VISIT EST AGE 18-39: CPT | Performed by: ADVANCED PRACTICE MIDWIFE

## 2020-12-08 NOTE — PROGRESS NOTES
Dee is a 35 year old  female who presents for annual exam. She has no specific questions or concerns. Her pap smear is due in 2 years. Happy to hear that. She has anxiety around exams and prefers her  to be present during exams. Will get a comprehensive metabolic panel as follow up from her gall bladder surgery. Had her gall bladder removed at the end of 2020. Her son Linus is doing well, growing fast. They are enjoying their time with him. Both working outside of the home and he is taken care of by grandma. Her father passed away recently from lung cancer, feels like she is coping well. Has had a hard postpartum period but coping appropriately and does not feel like anything else is needed. Declines STI testing. Blood testing is up to date from recent pregnancy, 4 months postpartum now. Discussed cholesterol is due when done breastfeeding. Not planning for more children. Using depo for birth control, last had it 2020 at the [facility]. Would like referral for dermatology for eczema. Has tried different creams at home with no success over the winter months. No other questions or concerns today.      No LMP recorded. Patient has had an injection. Using depo or other injectable for contraception.  She is not currently considering pregnancy.  Besides routine health maintenance, she has no other health concerns today .  GYNECOLOGIC HISTORY:  Menarche: 12  Dee is sexually active with 1 male partner(s) and is currently in monogamous relationship with .    History sexually transmitted infections:No STD history  STI testing offered?  Declined  ELLIOTT exposure: Unknown  History of abnormal Pap smear: NO - age 30- 65 PAP every 3 years recommended  Family history of breast CA: No  Family history of uterine/ovarian CA: No    Family history of colon CA: Yes (Please explain): PGM    HEALTH MAINTENANCE:  Cholesterol: (  Cholesterol   Date Value Ref Range Status   10/25/2016 234 (H) <200 mg/dL  Final     Comment:     Desirable:       <200 mg/dl    History of abnormal lipids: No    Mammo: N/A. History of abnormal Mammo: Not applicable.  Regular Self Breast Exams: Yes    Current or Past (Physical, Sexual or Emotional):  No  Do you feel safe in your environment:  Yes    Calcium/Vitamin D intake: source:  dairy, dietary supplement(s) Adequate? Yes   Last TDAP? 2019 Offered today? No    TSH: (  TSH   Date Value Ref Range Status   10/25/2016 0.84 0.40 - 4.00 mU/L Final    )  Pap; (  Lab Results   Component Value Date    PAP NIL 2017    PAP NIL 2014    PAP NIL 2011    )    HISTORY:  OB History    Para Term  AB Living   1 1 1 0 0 1   SAB TAB Ectopic Multiple Live Births   0 0 0 0 1      # Outcome Date GA Lbr Nacho/2nd Weight Sex Delivery Anes PTL Lv   1 Term 20 41w0d 18:23 / 03:55 4.19 kg (9 lb 3.8 oz) M Vag-Spont EPI N MELISSA      Name: Linus      Apgar1: 7  Apgar5: 9     Past Medical History:   Diagnosis Date     ASCUS favor benign 3/2006    neg HPV     Morbid obesity due to excess calories (H) 10/31/2016     Nexplanon insertion 2014    Implanon removed and replaced with Nexplanon nexplanon lot# [id number]  exp:  10/16      Streptococcal sore throat     recurrent pharyngitis     UTI 2003     Past Surgical History:   Procedure Laterality Date     implanon  12     LAPAROSCOPIC CHOLECYSTECTOMY WITH CHOLANGIOGRAMS N/A 2020    Procedure: CHOLECYSTECTOMY, LAPAROSCOPIC, WITH CHOLANGIOGRAM;  Surgeon: Andrew Hale MD;  Location:  OR     Family History   Problem Relation Age of Onset     Asthma Mother      Heart Failure Mother      Hypertension Mother      Chronic Obstructive Pulmonary Disease Mother      Diabetes Mother      Hypertension Father      Lipids Father      Gastrointestinal Disease Father      Lung Cancer Father      Cancer Maternal Grandmother         d:  age 53; lung with mets; smoker     Neurologic Disorder Maternal Aunt         b:  1967 ; MS      Seizure Disorder Brother         adolescents     Colon Cancer Paternal Grandmother      Social History     Socioeconomic History     Marital status:      Spouse name: None     Number of children: None     Years of education: None     Highest education level: None   Occupational History     None   Social Needs     Financial resource strain: None     Food insecurity     Worry: None     Inability: None     Transportation needs     Medical: None     Non-medical: None   Tobacco Use     Smoking status: Never Smoker     Smokeless tobacco: Never Used   Substance and Sexual Activity     Alcohol use: Not Currently     Comment: occ     Drug use: No     Sexual activity: Yes     Partners: Male     Comment: nuva ring   Lifestyle     Physical activity     Days per week: None     Minutes per session: None     Stress: Not at all   Relationships     Social connections     Talks on phone: None     Gets together: None     Attends [employer] service: None     Active member of club or organization: None     Attends meetings of clubs or organizations: None     Relationship status:      Intimate partner violence     Fear of current or ex partner: No     Emotionally abused: No     Physically abused: No     Forced sexual activity: No   Other Topics Concern     None   Social History Narrative    Born in [facility], lives with boyfriend of 16 yrs as of 2016, 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, doesn't drive        Balanced Diet - No    Osteoporosis Prevention Measures - lactose intollerant takes pills    Regular Exercise -  Stands all day at work    Dental Exam - Yes, almost a year ago    Seatbelts used - Yes,when remembers    Self Breast Exam - sometimes    Abuse: Current or Past (Physical, Sexual or Emotional)- No    Do you have any concerns about STD's -  No    Do you feel safe in your environment - Yes    Guns stored in the home - No    Sunscreen used - Never out in sun    Lipids -  NO    Glucose -  NO    Eye Exam - When  she was 16yrs old    Colon Cancer Screening - No    Hemoccults - NO    Pap Test -  Yers-03/21/06    Mammography - NO    DEXA - NO    Immunizations reviewed and up to date - yes, last td given 03/21/06       Current Outpatient Medications:      docusate sodium (COLACE) 100 MG capsule, Take 1 capsule (100 mg) by mouth 2 times daily (Patient taking differently: Take 100 mg by mouth daily ), Disp: 60 capsule, Rfl: 1     medroxyPROGESTERone (DEPO-PROVERA) 150 MG/ML IM injection, Inject 1 mL (150 mg) into the muscle every 3 months, Disp: 1 mL, Rfl: 4     Prenatal Vit-Fe Fumarate-FA (PRENATAL MULTIVITAMIN W/IRON) 27-0.8 MG tablet, Take 1 tablet by mouth daily, Disp: , Rfl:      Probiotic Product (PROBIOTIC DAILY PO), Take 1 tablet by mouth daily , Disp: , Rfl:     Current Facility-Administered Medications:      medroxyPROGESTERone (DEPO-PROVERA) injection 150 mg, 150 mg, Intramuscular, Q90 Days, Jesus Arenas APRN CNM, 150 mg at 09/11/20 1145     Allergies   Allergen Reactions     Adhesive Tape      Bandaids irritate skin     Amoxicillin      vaginal irritation after 3-4 days     Azithromycin      zithromax--migraines and stomach cramps     Sulfamethoxazole W/Trimethoprim      GI     Liquid Adhesive Rash     Nickel Rash       Past medical, surgical, social and family history were reviewed and updated in EPIC.    ROS:   C:     NEGATIVE for fever, chills, change in weight  I:       NEGATIVE for worrisome rashes, moles or lesions  E:     NEGATIVE for vision changes or irritation  E/M: NEGATIVE for ear, mouth and throat problems  R:     NEGATIVE for significant cough or SOB  CV:   NEGATIVE for chest pain, palpitations or peripheral edema  GI:     NEGATIVE for nausea, abdominal pain, heartburn, or change in bowel habits  :   NEGATIVE for frequency, dysuria, hematuria, vaginal discharge, or irregular bleeding  M:     NEGATIVE for significant arthralgias or myalgia  N:      NEGATIVE for weakness, dizziness or paresthesias  E:       NEGATIVE for temperature intolerance, skin/hair changes  P:      NEGATIVE for changes in mood or affect.    EXAM:  /72   Pulse 87   Temp 98.5  F (36.9  C) (Temporal)   Wt 79.8 kg (176 lb)   BMI 31.18 kg/m     BMI: Body mass index is 31.18 kg/m .  Constitutional: healthy, alert and no distress  Head: Normocephalic. No masses, lesions, tenderness or abnormalities  Neck: Neck supple. Trachea midline. No adenopathy. Thyroid symmetric, normal size.   Cardiovascular: RRR.   Respiratory: Negative.   Breast: symmetrical and non tender, lactating, nipples everted and intact  Gastrointestinal: Abdomen soft, non-tender, non-distended. No masses, organomegaly.  :  Vulva:  No external lesions, normal female hair distribution, no inguinal adenopathy.    Urethra:  Midline, non-tender, well supported, no discharge  Vagina:  Moist, pink, no abnormal discharge, no lesions  Uterus:  Normal size, anteverted, non-tender, freely mobile  Ovaries:  No masses appreciated, non-tender, mobile  Rectal Exam: deferred  Musculoskeletal: extremities normal  Skin: no suspicious lesions or rashes  Psychiatric: Affect appropriate, cooperative,mentation appears normal.     COUNSELING:   Reviewed preventive health counseling, as reflected in patient instructions   reports that she has never smoked. She has never used smokeless tobacco.    Body mass index is 31.18 kg/m .    FRAX Risk Assessment    ASSESSMENT:  35 year old female with satisfactory annual exam  (Z01.419) Encounter for gynecological examination without abnormal finding  (primary encounter diagnosis)  Plan: Comprehensive metabolic panel (BMP + Alb, Alk         Phos, ALT, AST, Total. Bili, TP)    (L30.9) Eczema, unspecified type  Plan: DERMATOLOGY ADULT REFERRAL    Follow up for annual exam or PRN.   DIANA Lopez CNM

## 2020-12-08 NOTE — NURSING NOTE
"Chief Complaint   Patient presents with     Physical       Initial /72   Pulse 87   Temp 98.5  F (36.9  C) (Temporal)   Wt 79.8 kg (176 lb)   BMI 31.18 kg/m   Estimated body mass index is 31.18 kg/m  as calculated from the following:    Height as of 20: 1.6 m (5' 3\").    Weight as of this encounter: 79.8 kg (176 lb).  BP completed using cuff size: regular    Questioned patient about current smoking habits.  Pt. has never smoked.          The following HM Due: NONE      The following patient reported/Care Every where data was sent to:  P ABSTRACT QUALITY INITIATIVES [[id number]]        N/a      Sepideh Youngblood MA                "

## 2020-12-09 LAB
ALBUMIN SERPL-MCNC: 3.9 G/DL (ref 3.4–5)
ALP SERPL-CCNC: 123 U/L (ref 40–150)
ALT SERPL W P-5'-P-CCNC: 42 U/L (ref 0–50)
ANION GAP SERPL CALCULATED.3IONS-SCNC: 6 MMOL/L (ref 3–14)
AST SERPL W P-5'-P-CCNC: 13 U/L (ref 0–45)
BILIRUB SERPL-MCNC: 0.3 MG/DL (ref 0.2–1.3)
BUN SERPL-MCNC: 13 MG/DL (ref 7–30)
CALCIUM SERPL-MCNC: 9 MG/DL (ref 8.5–10.1)
CHLORIDE SERPL-SCNC: 111 MMOL/L (ref 94–109)
CO2 SERPL-SCNC: 23 MMOL/L (ref 20–32)
CREAT SERPL-MCNC: 0.43 MG/DL (ref 0.52–1.04)
GFR SERPL CREATININE-BSD FRML MDRD: >90 ML/MIN/{1.73_M2}
GLUCOSE SERPL-MCNC: 79 MG/DL (ref 70–99)
POTASSIUM SERPL-SCNC: 3.7 MMOL/L (ref 3.4–5.3)
PROT SERPL-MCNC: 7.6 G/DL (ref 6.8–8.8)
SODIUM SERPL-SCNC: 140 MMOL/L (ref 133–144)

## 2020-12-14 ENCOUNTER — MEDICAL CORRESPONDENCE (OUTPATIENT)
Dept: HEALTH INFORMATION MANAGEMENT | Facility: CLINIC | Age: 35
End: 2020-12-14

## 2021-01-29 ENCOUNTER — MEDICAL CORRESPONDENCE (OUTPATIENT)
Dept: HEALTH INFORMATION MANAGEMENT | Facility: CLINIC | Age: 36
End: 2021-01-29

## 2021-04-04 ENCOUNTER — HEALTH MAINTENANCE LETTER (OUTPATIENT)
Age: 36
End: 2021-04-04

## 2021-09-18 ENCOUNTER — HEALTH MAINTENANCE LETTER (OUTPATIENT)
Age: 36
End: 2021-09-18

## 2022-01-08 ENCOUNTER — HEALTH MAINTENANCE LETTER (OUTPATIENT)
Age: 37
End: 2022-01-08

## 2022-10-14 ENCOUNTER — IMMUNIZATION (OUTPATIENT)
Dept: NURSING | Facility: CLINIC | Age: 37
End: 2022-10-14
Payer: COMMERCIAL

## 2022-10-14 ENCOUNTER — OFFICE VISIT (OUTPATIENT)
Dept: FAMILY MEDICINE | Facility: CLINIC | Age: 37
End: 2022-10-14
Payer: COMMERCIAL

## 2022-10-14 VITALS
TEMPERATURE: 99.6 F | BODY MASS INDEX: 37.17 KG/M2 | DIASTOLIC BLOOD PRESSURE: 84 MMHG | OXYGEN SATURATION: 99 % | WEIGHT: 209.8 LBS | HEART RATE: 114 BPM | HEIGHT: 63 IN | RESPIRATION RATE: 20 BRPM | SYSTOLIC BLOOD PRESSURE: 145 MMHG

## 2022-10-14 DIAGNOSIS — Z12.4 CERVICAL CANCER SCREENING: ICD-10-CM

## 2022-10-14 DIAGNOSIS — R03.0 ELEVATED BLOOD PRESSURE READING WITHOUT DIAGNOSIS OF HYPERTENSION: ICD-10-CM

## 2022-10-14 DIAGNOSIS — Z00.00 ROUTINE GENERAL MEDICAL EXAMINATION AT A HEALTH CARE FACILITY: Primary | ICD-10-CM

## 2022-10-14 LAB
ALBUMIN SERPL-MCNC: 3.6 G/DL (ref 3.4–5)
ALP SERPL-CCNC: 79 U/L (ref 40–150)
ALT SERPL W P-5'-P-CCNC: 22 U/L (ref 0–50)
ANION GAP SERPL CALCULATED.3IONS-SCNC: 10 MMOL/L (ref 3–14)
AST SERPL W P-5'-P-CCNC: 11 U/L (ref 0–45)
BILIRUB SERPL-MCNC: 0.3 MG/DL (ref 0.2–1.3)
BUN SERPL-MCNC: 15 MG/DL (ref 7–30)
CALCIUM SERPL-MCNC: 9.2 MG/DL (ref 8.5–10.1)
CHLORIDE BLD-SCNC: 109 MMOL/L (ref 94–109)
CHOLEST SERPL-MCNC: 243 MG/DL
CO2 SERPL-SCNC: 21 MMOL/L (ref 20–32)
CREAT SERPL-MCNC: 0.61 MG/DL (ref 0.52–1.04)
ERYTHROCYTE [DISTWIDTH] IN BLOOD BY AUTOMATED COUNT: 12.2 % (ref 10–15)
FASTING STATUS PATIENT QL REPORTED: NO
GFR SERPL CREATININE-BSD FRML MDRD: >90 ML/MIN/1.73M2
GLUCOSE BLD-MCNC: 101 MG/DL (ref 70–99)
HCT VFR BLD AUTO: 40.6 % (ref 35–47)
HDLC SERPL-MCNC: 43 MG/DL
HGB BLD-MCNC: 13.4 G/DL (ref 11.7–15.7)
LDLC SERPL CALC-MCNC: 136 MG/DL
MCH RBC QN AUTO: 28 PG (ref 26.5–33)
MCHC RBC AUTO-ENTMCNC: 33 G/DL (ref 31.5–36.5)
MCV RBC AUTO: 85 FL (ref 78–100)
NONHDLC SERPL-MCNC: 200 MG/DL
PLATELET # BLD AUTO: 249 10E3/UL (ref 150–450)
POTASSIUM BLD-SCNC: 3.5 MMOL/L (ref 3.4–5.3)
PROT SERPL-MCNC: 7.7 G/DL (ref 6.8–8.8)
RBC # BLD AUTO: 4.78 10E6/UL (ref 3.8–5.2)
SODIUM SERPL-SCNC: 140 MMOL/L (ref 133–144)
TRIGL SERPL-MCNC: 318 MG/DL
WBC # BLD AUTO: 8.2 10E3/UL (ref 4–11)

## 2022-10-14 PROCEDURE — 85027 COMPLETE CBC AUTOMATED: CPT | Performed by: FAMILY MEDICINE

## 2022-10-14 PROCEDURE — 87624 HPV HI-RISK TYP POOLED RSLT: CPT | Performed by: FAMILY MEDICINE

## 2022-10-14 PROCEDURE — 99395 PREV VISIT EST AGE 18-39: CPT | Performed by: FAMILY MEDICINE

## 2022-10-14 PROCEDURE — 80053 COMPREHEN METABOLIC PANEL: CPT | Performed by: FAMILY MEDICINE

## 2022-10-14 PROCEDURE — 80061 LIPID PANEL: CPT | Performed by: FAMILY MEDICINE

## 2022-10-14 PROCEDURE — G0145 SCR C/V CYTO,THINLAYER,RESCR: HCPCS | Performed by: FAMILY MEDICINE

## 2022-10-14 PROCEDURE — 36415 COLL VENOUS BLD VENIPUNCTURE: CPT | Performed by: FAMILY MEDICINE

## 2022-10-14 PROCEDURE — 90471 IMMUNIZATION ADMIN: CPT

## 2022-10-14 PROCEDURE — 90686 IIV4 VACC NO PRSV 0.5 ML IM: CPT

## 2022-10-14 ASSESSMENT — PAIN SCALES - GENERAL: PAINLEVEL: NO PAIN (0)

## 2022-10-14 ASSESSMENT — ENCOUNTER SYMPTOMS
NERVOUS/ANXIOUS: 0
DIZZINESS: 0
HEADACHES: 0
WEAKNESS: 0
SHORTNESS OF BREATH: 0
CHILLS: 0
CONSTIPATION: 0
COUGH: 0
FEVER: 0
HEMATURIA: 0
SORE THROAT: 0
ARTHRALGIAS: 0
FREQUENCY: 0
HEARTBURN: 0
HEMATOCHEZIA: 0
JOINT SWELLING: 0
BREAST MASS: 0
NAUSEA: 0
ABDOMINAL PAIN: 0
DYSURIA: 0
PARESTHESIAS: 0
PALPITATIONS: 0
DIARRHEA: 0
EYE PAIN: 0
MYALGIAS: 0
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
## 2022-10-18 LAB
BKR LAB AP GYN ADEQUACY: NORMAL
BKR LAB AP GYN INTERPRETATION: NORMAL
BKR LAB AP HPV REFLEX: NORMAL
BKR LAB AP PREVIOUS ABNORMAL: NORMAL
PATH REPORT.COMMENTS IMP SPEC: NORMAL
PATH REPORT.COMMENTS IMP SPEC: NORMAL
PATH REPORT.RELEVANT HX SPEC: NORMAL

## 2022-10-20 LAB
HUMAN PAPILLOMA VIRUS 16 DNA: NEGATIVE
HUMAN PAPILLOMA VIRUS 18 DNA: NEGATIVE
HUMAN PAPILLOMA VIRUS FINAL DIAGNOSIS: NORMAL
HUMAN PAPILLOMA VIRUS OTHER HR: NEGATIVE

## 2023-09-14 ENCOUNTER — PATIENT OUTREACH (OUTPATIENT)
Dept: CARE COORDINATION | Facility: CLINIC | Age: 38
End: 2023-09-14
Payer: COMMERCIAL

## 2023-09-25 ENCOUNTER — OFFICE VISIT (OUTPATIENT)
Dept: FAMILY MEDICINE | Facility: CLINIC | Age: 38
End: 2023-09-25
Payer: COMMERCIAL

## 2023-09-25 ENCOUNTER — ANCILLARY PROCEDURE (OUTPATIENT)
Dept: GENERAL RADIOLOGY | Facility: CLINIC | Age: 38
End: 2023-09-25
Attending: FAMILY MEDICINE
Payer: COMMERCIAL

## 2023-09-25 VITALS
DIASTOLIC BLOOD PRESSURE: 85 MMHG | RESPIRATION RATE: 20 BRPM | SYSTOLIC BLOOD PRESSURE: 131 MMHG | OXYGEN SATURATION: 98 % | HEIGHT: 63 IN | TEMPERATURE: 98.3 F | HEART RATE: 105 BPM | WEIGHT: 215 LBS | BODY MASS INDEX: 38.09 KG/M2

## 2023-09-25 DIAGNOSIS — R05.2 SUBACUTE COUGH: ICD-10-CM

## 2023-09-25 DIAGNOSIS — R05.2 SUBACUTE COUGH: Primary | ICD-10-CM

## 2023-09-25 PROCEDURE — 71046 X-RAY EXAM CHEST 2 VIEWS: CPT | Mod: TC | Performed by: RADIOLOGY

## 2023-09-25 PROCEDURE — 99214 OFFICE O/P EST MOD 30 MIN: CPT | Performed by: FAMILY MEDICINE

## 2023-09-25 RX ORDER — BENZONATATE 200 MG/1
200 CAPSULE ORAL 3 TIMES DAILY PRN
Qty: 30 CAPSULE | Refills: 0 | Status: SHIPPED | OUTPATIENT
Start: 2023-09-25

## 2023-09-25 RX ORDER — PREDNISONE 20 MG/1
20 TABLET ORAL
Qty: 5 TABLET | Refills: 0 | Status: SHIPPED | OUTPATIENT
Start: 2023-09-25 | End: 2023-09-30

## 2023-09-25 RX ORDER — ETONOGESTREL AND ETHINYL ESTRADIOL VAGINAL RING .015; .12 MG/D; MG/D
RING VAGINAL
COMMUNITY
Start: 2022-07-18

## 2023-09-25 NOTE — PROGRESS NOTES
"  Assessment & Plan     Subacute cough  5-week history of URI symptoms, with primary symptom consisting of a deep, dry cough.  Fatigued but otherwise well-appearing with O2 saturation of 98% and clear lung fields.  However, given duration of symptoms chest x-ray was obtained and negative.  Because of this, will treat for suspected viral bronchitis with a course of prednisone.  We will also offer Tessalon Perles for symptom management.  Return precautions reviewed.  - XR Chest 2 Views; Future  - benzonatate (TESSALON) 200 MG capsule; Take 1 capsule (200 mg) by mouth 3 times daily as needed for cough  - predniSONE (DELTASONE) 20 MG tablet; Take 1 tablet (20 mg) by mouth daily (with breakfast) for 5 days      [facility]    Connor Price is a 38 year old, presenting for the following health issues:  Cough (Severe dry cough, wheezing, SOB, dry heaving, denies fever, albuterol and vicks steam inhaler doesn't help)        9/25/2023     7:23 AM   Additional Questions   Roomed by Dayana RODRIGUEZ LPN       History of Present Illness       Reason for visit:  Severe cough  Symptom onset:  3-7 days ago    She eats 2-3 servings of fruits and vegetables daily.She consumes 0 sweetened beverage(s) daily.She exercises with enough effort to increase her heart rate 20 to 29 minutes per day.  She exercises with enough effort to increase her heart rate 3 or less days per week.   She is taking medications regularly.     Currently symptoms x5 weeks though over the past couple weeks has noticed severe dry cough with bladder leakage and posttussive emesis, occasional wheezing.  Denies chest pain or shortness of breath.  Has an old albuterol inhaler that does not seem to help her symptoms.  Has a 3-year-old toddler at home who is \" always sick\" from .  Reports episodes of bronchitis in the past and family history of lung disease.  Difficult sleep due to coughing, which has been difficult for " "her and her .  Has tried OTC cold medication with little relief.     COVID negative multiple times at home      Objective    /85   Pulse 105   Temp 98.3  F (36.8  C) (Oral)   Resp 20   Ht 1.607 m (5' 3.25\")   Wt 97.5 kg (215 lb)   LMP 08/07/2023 (Approximate)   SpO2 98%   BMI 37.79 kg/m    Body mass index is 37.79 kg/m .  Physical Exam   GENERAL: alert, no distress, and fatigued  EYES: Eyes grossly normal to inspection, PERRL and conjunctivae and sclerae normal  HENT: ear canals and TM's normal, nose and mouth without ulcers or lesions  NECK: no adenopathy, no asymmetry, masses, or scars and thyroid normal to palpation  RESP: lungs clear to auscultation - no rales, rhonchi or wheezes  CV: tachycardia and no murmur, click or rub  PSYCH: mentation appears normal and affect normal/bright    Chest x-ray individually interpreted.  Clear lung fields, no evidence of pneumonia    "

## 2023-09-28 ENCOUNTER — PATIENT OUTREACH (OUTPATIENT)
Dept: CARE COORDINATION | Facility: CLINIC | Age: 38
End: 2023-09-28
Payer: COMMERCIAL

## 2023-11-19 ENCOUNTER — HEALTH MAINTENANCE LETTER (OUTPATIENT)
Age: 38
End: 2023-11-19

## 2024-08-25 ENCOUNTER — HEALTH MAINTENANCE LETTER (OUTPATIENT)
Age: 39
End: 2024-08-25

## 2024-12-29 ENCOUNTER — HEALTH MAINTENANCE LETTER (OUTPATIENT)
Age: 39
End: 2024-12-29

## 2025-08-17 SDOH — HEALTH STABILITY: PHYSICAL HEALTH: ON AVERAGE, HOW MANY DAYS PER WEEK DO YOU ENGAGE IN MODERATE TO STRENUOUS EXERCISE (LIKE A BRISK WALK)?: 3 DAYS

## 2025-08-17 SDOH — HEALTH STABILITY: PHYSICAL HEALTH: ON AVERAGE, HOW MANY MINUTES DO YOU ENGAGE IN EXERCISE AT THIS LEVEL?: 40 MIN

## 2025-08-17 ASSESSMENT — SOCIAL DETERMINANTS OF HEALTH (SDOH): HOW OFTEN DO YOU GET TOGETHER WITH FRIENDS OR RELATIVES?: PATIENT DECLINED

## 2025-08-18 ENCOUNTER — OFFICE VISIT (OUTPATIENT)
Dept: FAMILY MEDICINE | Facility: CLINIC | Age: 40
End: 2025-08-18
Payer: COMMERCIAL

## 2025-08-18 ENCOUNTER — ANCILLARY PROCEDURE (OUTPATIENT)
Dept: MAMMOGRAPHY | Facility: CLINIC | Age: 40
End: 2025-08-18
Attending: FAMILY MEDICINE
Payer: COMMERCIAL

## 2025-08-18 VITALS
TEMPERATURE: 98.1 F | SYSTOLIC BLOOD PRESSURE: 128 MMHG | HEART RATE: 101 BPM | HEIGHT: 63 IN | WEIGHT: 213.2 LBS | DIASTOLIC BLOOD PRESSURE: 88 MMHG | OXYGEN SATURATION: 97 % | RESPIRATION RATE: 14 BRPM | BODY MASS INDEX: 37.78 KG/M2

## 2025-08-18 DIAGNOSIS — Z00.00 ROUTINE GENERAL MEDICAL EXAMINATION AT A HEALTH CARE FACILITY: Primary | ICD-10-CM

## 2025-08-18 DIAGNOSIS — Z13.6 CARDIOVASCULAR SCREENING; LDL GOAL LESS THAN 160: ICD-10-CM

## 2025-08-18 DIAGNOSIS — J45.20 MILD INTERMITTENT ASTHMA WITHOUT COMPLICATION: ICD-10-CM

## 2025-08-18 DIAGNOSIS — J30.2 SEASONAL ALLERGIC RHINITIS, UNSPECIFIED TRIGGER: ICD-10-CM

## 2025-08-18 DIAGNOSIS — Z12.31 VISIT FOR SCREENING MAMMOGRAM: ICD-10-CM

## 2025-08-18 PROBLEM — K81.9 CHOLECYSTITIS: Status: RESOLVED | Noted: 2020-09-29 | Resolved: 2025-08-18

## 2025-08-18 LAB
ALBUMIN SERPL BCG-MCNC: 4 G/DL (ref 3.5–5.2)
ALP SERPL-CCNC: 79 U/L (ref 40–150)
ALT SERPL W P-5'-P-CCNC: 17 U/L (ref 0–50)
ANION GAP SERPL CALCULATED.3IONS-SCNC: 12 MMOL/L (ref 7–15)
AST SERPL W P-5'-P-CCNC: 18 U/L (ref 0–45)
BILIRUB SERPL-MCNC: 0.3 MG/DL
BUN SERPL-MCNC: 10.2 MG/DL (ref 6–20)
CALCIUM SERPL-MCNC: 9.2 MG/DL (ref 8.8–10.4)
CHLORIDE SERPL-SCNC: 106 MMOL/L (ref 98–107)
CHOLEST SERPL-MCNC: 212 MG/DL
CREAT SERPL-MCNC: 0.6 MG/DL (ref 0.51–0.95)
EGFRCR SERPLBLD CKD-EPI 2021: >90 ML/MIN/1.73M2
ERYTHROCYTE [DISTWIDTH] IN BLOOD BY AUTOMATED COUNT: 12.7 % (ref 10–15)
FASTING STATUS PATIENT QL REPORTED: YES
FASTING STATUS PATIENT QL REPORTED: YES
GLUCOSE SERPL-MCNC: 88 MG/DL (ref 70–99)
HCO3 SERPL-SCNC: 23 MMOL/L (ref 22–29)
HCT VFR BLD AUTO: 42.6 % (ref 35–47)
HDLC SERPL-MCNC: 53 MG/DL
HGB BLD-MCNC: 14.1 G/DL (ref 11.7–15.7)
LDLC SERPL CALC-MCNC: 101 MG/DL
MCH RBC QN AUTO: 27.4 PG (ref 26.5–33)
MCHC RBC AUTO-ENTMCNC: 33.1 G/DL (ref 31.5–36.5)
MCV RBC AUTO: 82.7 FL (ref 78–100)
NONHDLC SERPL-MCNC: 159 MG/DL
PLATELET # BLD AUTO: 263 10E3/UL (ref 150–450)
POTASSIUM SERPL-SCNC: 4.1 MMOL/L (ref 3.4–5.3)
PROT SERPL-MCNC: 7.3 G/DL (ref 6.4–8.3)
RBC # BLD AUTO: 5.15 10E6/UL (ref 3.8–5.2)
SODIUM SERPL-SCNC: 141 MMOL/L (ref 135–145)
TRIGL SERPL-MCNC: 289 MG/DL
WBC # BLD AUTO: 6.42 10E3/UL (ref 4–11)

## 2025-08-18 PROCEDURE — 77063 BREAST TOMOSYNTHESIS BI: CPT | Mod: TC | Performed by: RADIOLOGY

## 2025-08-18 PROCEDURE — 1126F AMNT PAIN NOTED NONE PRSNT: CPT

## 2025-08-18 PROCEDURE — 80061 LIPID PANEL: CPT

## 2025-08-18 PROCEDURE — 3074F SYST BP LT 130 MM HG: CPT

## 2025-08-18 PROCEDURE — 80053 COMPREHEN METABOLIC PANEL: CPT

## 2025-08-18 PROCEDURE — 99213 OFFICE O/P EST LOW 20 MIN: CPT | Mod: 25

## 2025-08-18 PROCEDURE — 85027 COMPLETE CBC AUTOMATED: CPT

## 2025-08-18 PROCEDURE — 36415 COLL VENOUS BLD VENIPUNCTURE: CPT

## 2025-08-18 PROCEDURE — 77067 SCR MAMMO BI INCL CAD: CPT | Mod: TC | Performed by: RADIOLOGY

## 2025-08-18 PROCEDURE — 3079F DIAST BP 80-89 MM HG: CPT

## 2025-08-18 PROCEDURE — 99396 PREV VISIT EST AGE 40-64: CPT

## 2025-08-18 RX ORDER — CETIRIZINE HYDROCHLORIDE 10 MG/1
10 TABLET ORAL DAILY
COMMUNITY

## 2025-08-18 RX ORDER — ALBUTEROL SULFATE 90 UG/1
1-2 INHALANT RESPIRATORY (INHALATION) EVERY 6 HOURS PRN
COMMUNITY
Start: 2025-07-29 | End: 2025-08-18

## 2025-08-18 RX ORDER — FLUTICASONE PROPIONATE 50 MCG
1 SPRAY, SUSPENSION (ML) NASAL DAILY
COMMUNITY

## 2025-08-18 RX ORDER — ALBUTEROL SULFATE 90 UG/1
1-2 INHALANT RESPIRATORY (INHALATION) EVERY 6 HOURS PRN
Qty: 18 G | Refills: 2 | Status: SHIPPED | OUTPATIENT
Start: 2025-08-18

## 2025-08-18 RX ORDER — ETONOGESTREL AND ETHINYL ESTRADIOL VAGINAL RING .015; .12 MG/D; MG/D
RING VAGINAL
Qty: 3 EACH | Refills: 4 | Status: SHIPPED | OUTPATIENT
Start: 2025-08-18

## 2025-08-18 ASSESSMENT — PAIN SCALES - GENERAL: PAINLEVEL_OUTOF10: NO PAIN (0)

## (undated) DEVICE — NDL ANGIOCATH 14GA 1.25" 4048

## (undated) DEVICE — ESU HOLDER LAP INST DISP PURPLE LONG 330MM H-PRO-330

## (undated) DEVICE — SOL WATER IRRIG 1000ML BOTTLE 2F7114

## (undated) DEVICE — GLOVE GAMMEX DERMAPRENE ULTRA SZ 8 LF 8516

## (undated) DEVICE — CONNECTOR STOPCOCK 3 WAY MALE LL 2C6204

## (undated) DEVICE — CLIP APPLIER ENDO 5MM M/L LIGAMAX EL5ML

## (undated) DEVICE — ENDO TROCAR SLEEVE KII Z-THREADED 05X100MM CTS02

## (undated) DEVICE — ADH SKIN CLOSURE PREMIERPRO EXOFIN 1.0ML 3470

## (undated) DEVICE — ENDO TROCAR FIRST ENTRY KII FIOS Z-THRD 12X100MM CTF73

## (undated) DEVICE — ENDO POUCH UNIV RETRIEVAL SYSTEM INZII 10MM CD001

## (undated) DEVICE — ENDO SCOPE WARMER LF TM500

## (undated) DEVICE — SU MONOCRYL 4-0 PS-2 18" UND Y496G

## (undated) DEVICE — RAD RX ISOVUE 300 (50ML) 61% IOPAMIDOL CHARGE PER ML

## (undated) DEVICE — SUCTION IRR STRYKERFLOW II W/TIP 250-070-520

## (undated) DEVICE — DECANTER VIAL 2006S

## (undated) DEVICE — ESU GROUND PAD UNIVERSAL W/O CORD

## (undated) DEVICE — SOL NACL 0.9% INJ 1000ML BAG 2B1324X

## (undated) DEVICE — SUCTION CANISTER MEDIVAC LINER 3000ML W/LID 65651-530

## (undated) DEVICE — PACK LAP CHOLE SLC15LCFSD

## (undated) DEVICE — PREP CHLORAPREP 26ML TINTED ORANGE  260815

## (undated) DEVICE — ENDO TROCAR FIRST ENTRY KII FIOS Z-THRD 05X100MM CTF03

## (undated) DEVICE — SYR 30ML LL W/O NDL 302832

## (undated) DEVICE — CATH CHOLANGIOGRAM 4.5FR TAUT METAL TIP 20018-M55

## (undated) DEVICE — SU VICRYL 0 UR-6 27" J603H

## (undated) DEVICE — LINEN TOWEL PACK X5 5464

## (undated) DEVICE — GOWN XXLG REINFORCED 9071EL

## (undated) DEVICE — GLOVE PROTEXIS W/NEU-THERA 7.5  2D73TE75

## (undated) DEVICE — TUBING IV EXTENSION SET ANESTHESIA 34" MLL 2C6227

## (undated) RX ORDER — HYDROMORPHONE HYDROCHLORIDE 1 MG/ML
INJECTION, SOLUTION INTRAMUSCULAR; INTRAVENOUS; SUBCUTANEOUS
Status: DISPENSED
Start: 2020-09-30

## (undated) RX ORDER — GLYCOPYRROLATE 0.2 MG/ML
INJECTION, SOLUTION INTRAMUSCULAR; INTRAVENOUS
Status: DISPENSED
Start: 2020-09-30

## (undated) RX ORDER — DEXAMETHASONE SODIUM PHOSPHATE 4 MG/ML
INJECTION, SOLUTION INTRA-ARTICULAR; INTRALESIONAL; INTRAMUSCULAR; INTRAVENOUS; SOFT TISSUE
Status: DISPENSED
Start: 2020-09-30

## (undated) RX ORDER — KETOROLAC TROMETHAMINE 30 MG/ML
INJECTION, SOLUTION INTRAMUSCULAR; INTRAVENOUS
Status: DISPENSED
Start: 2020-09-30

## (undated) RX ORDER — ONDANSETRON 2 MG/ML
INJECTION INTRAMUSCULAR; INTRAVENOUS
Status: DISPENSED
Start: 2020-09-30

## (undated) RX ORDER — NEOSTIGMINE METHYLSULFATE 1 MG/ML
VIAL (ML) INJECTION
Status: DISPENSED
Start: 2020-09-30

## (undated) RX ORDER — FENTANYL CITRATE 50 UG/ML
INJECTION, SOLUTION INTRAMUSCULAR; INTRAVENOUS
Status: DISPENSED
Start: 2020-09-30